# Patient Record
Sex: FEMALE | Race: BLACK OR AFRICAN AMERICAN | NOT HISPANIC OR LATINO | Employment: OTHER | URBAN - METROPOLITAN AREA
[De-identification: names, ages, dates, MRNs, and addresses within clinical notes are randomized per-mention and may not be internally consistent; named-entity substitution may affect disease eponyms.]

---

## 2023-04-12 ENCOUNTER — OFFICE VISIT (OUTPATIENT)
Dept: PRIMARY CARE | Facility: CLINIC | Age: 78
End: 2023-04-12
Payer: MEDICARE

## 2023-04-12 VITALS
HEIGHT: 69 IN | BODY MASS INDEX: 36.14 KG/M2 | WEIGHT: 244 LBS | SYSTOLIC BLOOD PRESSURE: 110 MMHG | TEMPERATURE: 97.1 F | DIASTOLIC BLOOD PRESSURE: 70 MMHG

## 2023-04-12 DIAGNOSIS — I27.21 PULMONARY ARTERIAL HYPERTENSION (MULTI): ICD-10-CM

## 2023-04-12 DIAGNOSIS — J44.9 CHRONIC OBSTRUCTIVE PULMONARY DISEASE, UNSPECIFIED (MULTI): ICD-10-CM

## 2023-04-12 DIAGNOSIS — Z86.39 H/O: OBESITY: ICD-10-CM

## 2023-04-12 DIAGNOSIS — I10 HYPERTENSION, UNSPECIFIED TYPE: ICD-10-CM

## 2023-04-12 DIAGNOSIS — G47.30 SLEEP APNEA, UNSPECIFIED TYPE: Primary | ICD-10-CM

## 2023-04-12 PROCEDURE — 3078F DIAST BP <80 MM HG: CPT | Performed by: INTERNAL MEDICINE

## 2023-04-12 PROCEDURE — 3074F SYST BP LT 130 MM HG: CPT | Performed by: INTERNAL MEDICINE

## 2023-04-12 PROCEDURE — 99214 OFFICE O/P EST MOD 30 MIN: CPT | Performed by: INTERNAL MEDICINE

## 2023-04-12 PROCEDURE — G0439 PPPS, SUBSEQ VISIT: HCPCS | Performed by: INTERNAL MEDICINE

## 2023-04-12 NOTE — PROGRESS NOTES
I reviewed with the resident the medical history and the resident’s findings on physical examination.  I discussed with the resident the patient’s diagnosis and concur with the treatment plan as documented in the resident note.     I saw and evaluated the patient. I personally obtained the key and critical portions of the history and physical exam or was physically present for key and critical portions performed by the trainee. I reviewed the trainee's documentation and discussed the patient with the trainee. I agree with the trainee's medical decision making, as documented on the trainee's note.     Coretta Silver MD

## 2023-04-12 NOTE — PATIENT INSTRUCTIONS
Thank you for visiting Dr. Vitale and Dr. Hitchcock at the clinic today. It was juan meeting you today! We discussed your current shortness of breath and bilateral knee pain. We discussed how it could be a cardiac or pulmonary issue. After reviewing your imaging we are concerned for Pulmonary  Hypertension. We plan to evaluate you for sleep apnea and to see a specialist in pulmonary HTN.

## 2023-04-12 NOTE — PROGRESS NOTES
Subjective   Patient ID: Florecita Forde is a 77 y.o. female who presents for Follow-up.  FLORECITA GATES is a 77 year old female with PMHx of HTN, A. fib on eliquis, COPD, Osteoporsis, and Obesity who presents for her wellness exam and follow up from a CXR result.  Patient reports that she has been short of breath for little while now.  She became short of breath with exertion and reports that she has difficulty lying down as she becomes short of breath and becomes dizzy she denies chest pain however she does admit to some constipation.  She denies any lower extremity swelling and reports her urination is good.  Patient recently had a chest x-ray which showed concerns for pulmonary congestion along with an echo that showed concerns for pulmonary hypertension.  Patient is high risk for sleep apnea so we will obtain a sleep study.  Patient does report that she needs bilateral knee surgery however she needs to continue to lose weight first.  She uses a stationary bike and finds no difficulty with that.  Patient's knee is particularly worse with steps.  Patient lives alone on a one-story floor uses a walker and cane for stability.  Review of Systems  - CONSTITUTIONAL: Denies weight loss, fever and chills.  - HEENT: Denies changes in vision and hearing.  - RESPIRATORY: admits to SOB but denies cough.  - CV: Denies palpitations and CP.   - GI: Denies abdominal pain, nausea, vomiting and diarrhea.  - : Denies dysuria and urinary frequency.  - MSK: Denies myalgia and joint pain.  - SKIN: Denies rash and pruritus.  - NEUROLOGICAL: Denies headache and syncope.  - PSYCHIATRIC: Denies recent changes in mood. Denies anxiety and depression.  Objective   Physical Exam  Constitutional: patient is alert and cooperative with exam  skin: dry and warm  Eyes: EOMI and clear sclera  ENMT: moist mucous membranes  Head/Neck: obese neck  Respiratory/Thorax: Clear to auscultation bilaterally, no wheezing or crackles  appreciated  Cardiovascular: irregular regular rate and rhythm, S1 and S2 present, no murmurs heard  Gastrointestinal: bowel sounds present, no pain or tenderness upon palpation, soft and nondistended  Extremities: +2 radial, posterior tibial, and pedal pulse, no lower extremity edema noted  Neurological: AxOx3  Assessment/Plan   Diagnoses and all orders for this visit:  Sleep apnea, unspecified type  -     In-Center Sleep Study (Non-Sleep Provider Only)  Pulmonary arterial hypertension (CMS/HCC)  -     Referral to Pulmonary Hypertension; Future  Hypertension, unspecified type  -     CBC; Future  -     Comprehensive Metabolic Panel; Future  -     Tsh With Reflex To Free T4 If Abnormal; Future  -     Hemoglobin A1c; Future  -     Urinalysis with Reflex Microscopic; Future  H/O: obesity  -     Hemoglobin A1c; Future    FLORECITA CORTES is a 77 year old female with PMHx of HTN, A. fib on eliquis, COPD, Osteoporsis, and Obesity who presents for her wellness exam.     #Dyspnea With Exertion   - Pulm HTN vs Sleep Apnea vs new onset heart failure   - CXR on April 5, 2023 showed pulmonary vascular congestion with a component of mild interstitial edema   - ECHO showed EF 7-75%, LA moderately to severely dilated, moderate tricuspid regurg, severely elevated pulm artery pressure  - referral to pulm HTN sent along with sleep study    #HTN   - /70  - amlodipine 5mg every day and metoprolol tartrate 25mg BID     #HLD   - atorvastatin 20mg every day     #A. Fib  - on eliquis 5mg BID    #COPD  - albuterol  and breo ellipta with albuterol nebulizer     #Osteoporsis  - alendronate 7omg every day   - DEXA scan November 2023    #Obesity     #Health Maintenance   - mammogram on July 2022 showed no mammgraphic evidence of malignancy, repeat is due July 2023  - DEXA November 2021, T-score -2.8, repeat due Novemeber 2023   - colonoscopy done November 2021 showed multiple polyps with pathophys tubular adenoma with repeat  colonoscopy in 3 years, November 2024  - immunizations up to date  - lab work done reviewed, is due for repeat    Omero Vitale  Internal Medicine PGY-3  [facility]

## 2023-04-13 RX ORDER — FLUTICASONE FUROATE AND VILANTEROL TRIFENATATE 100; 25 UG/1; UG/1
POWDER RESPIRATORY (INHALATION)
Qty: 60 EACH | Refills: 2 | Status: SHIPPED | OUTPATIENT
Start: 2023-04-13 | End: 2023-06-27

## 2023-04-19 DIAGNOSIS — I27.21 PULMONARY ARTERIAL HYPERTENSION (MULTI): ICD-10-CM

## 2023-04-19 RX ORDER — APIXABAN 5 MG/1
5 TABLET, FILM COATED ORAL 2 TIMES DAILY
COMMUNITY
End: 2023-04-19 | Stop reason: SDUPTHER

## 2023-05-31 DIAGNOSIS — I10 HYPERTENSION, UNSPECIFIED TYPE: ICD-10-CM

## 2023-06-01 RX ORDER — METOPROLOL TARTRATE 25 MG/1
TABLET, FILM COATED ORAL
Qty: 180 TABLET | Refills: 0 | Status: SHIPPED | OUTPATIENT
Start: 2023-06-01 | End: 2023-09-15

## 2023-06-07 ENCOUNTER — LAB (OUTPATIENT)
Dept: LAB | Facility: LAB | Age: 78
End: 2023-06-07
Payer: MEDICARE

## 2023-06-07 DIAGNOSIS — Z86.39 H/O: OBESITY: ICD-10-CM

## 2023-06-07 DIAGNOSIS — I10 HYPERTENSION, UNSPECIFIED TYPE: ICD-10-CM

## 2023-06-07 LAB
ALANINE AMINOTRANSFERASE (SGPT) (U/L) IN SER/PLAS: 10 U/L (ref 7–45)
ALBUMIN (G/DL) IN SER/PLAS: 3.7 G/DL (ref 3.4–5)
ALKALINE PHOSPHATASE (U/L) IN SER/PLAS: 82 U/L (ref 33–136)
ANION GAP IN SER/PLAS: 13 MMOL/L (ref 10–20)
APPEARANCE, URINE: ABNORMAL
ASPARTATE AMINOTRANSFERASE (SGOT) (U/L) IN SER/PLAS: 12 U/L (ref 9–39)
BACTERIA, URINE: ABNORMAL /HPF
BILIRUBIN TOTAL (MG/DL) IN SER/PLAS: 1 MG/DL (ref 0–1.2)
BILIRUBIN, URINE: NEGATIVE
BLOOD, URINE: NEGATIVE
CALCIUM (MG/DL) IN SER/PLAS: 9.7 MG/DL (ref 8.6–10.6)
CARBON DIOXIDE, TOTAL (MMOL/L) IN SER/PLAS: 32 MMOL/L (ref 21–32)
CHLORIDE (MMOL/L) IN SER/PLAS: 104 MMOL/L (ref 98–107)
COLOR, URINE: YELLOW
CREATININE (MG/DL) IN SER/PLAS: 0.53 MG/DL (ref 0.5–1.05)
ERYTHROCYTE DISTRIBUTION WIDTH (RATIO) BY AUTOMATED COUNT: 14.3 % (ref 11.5–14.5)
ERYTHROCYTE MEAN CORPUSCULAR HEMOGLOBIN CONCENTRATION (G/DL) BY AUTOMATED: 30.2 G/DL (ref 32–36)
ERYTHROCYTE MEAN CORPUSCULAR VOLUME (FL) BY AUTOMATED COUNT: 99 FL (ref 80–100)
ERYTHROCYTES (10*6/UL) IN BLOOD BY AUTOMATED COUNT: 3.73 X10E12/L (ref 4–5.2)
ESTIMATED AVERAGE GLUCOSE FOR HBA1C: 134 MG/DL
GFR FEMALE: >90 ML/MIN/1.73M2
GLUCOSE (MG/DL) IN SER/PLAS: 110 MG/DL (ref 74–99)
GLUCOSE, URINE: NEGATIVE MG/DL
HEMATOCRIT (%) IN BLOOD BY AUTOMATED COUNT: 37.1 % (ref 36–46)
HEMOGLOBIN (G/DL) IN BLOOD: 11.2 G/DL (ref 12–16)
HEMOGLOBIN A1C/HEMOGLOBIN TOTAL IN BLOOD: 6.3 %
KETONES, URINE: NEGATIVE MG/DL
LEUKOCYTE ESTERASE, URINE: ABNORMAL
LEUKOCYTES (10*3/UL) IN BLOOD BY AUTOMATED COUNT: 8 X10E9/L (ref 4.4–11.3)
MUCUS, URINE: ABNORMAL /LPF
NITRITE, URINE: POSITIVE
NRBC (PER 100 WBCS) BY AUTOMATED COUNT: 0 /100 WBC (ref 0–0)
PH, URINE: 7 (ref 5–8)
PLATELETS (10*3/UL) IN BLOOD AUTOMATED COUNT: 425 X10E9/L (ref 150–450)
POTASSIUM (MMOL/L) IN SER/PLAS: 3.8 MMOL/L (ref 3.5–5.3)
PROTEIN TOTAL: 7.4 G/DL (ref 6.4–8.2)
PROTEIN, URINE: ABNORMAL MG/DL
RBC, URINE: 7 /HPF (ref 0–5)
SODIUM (MMOL/L) IN SER/PLAS: 145 MMOL/L (ref 136–145)
SPECIFIC GRAVITY, URINE: 1.01 (ref 1–1.03)
SQUAMOUS EPITHELIAL CELLS, URINE: 2 /HPF
THYROTROPIN (MIU/L) IN SER/PLAS BY DETECTION LIMIT <= 0.05 MIU/L: 0.71 MIU/L (ref 0.44–3.98)
UREA NITROGEN (MG/DL) IN SER/PLAS: 7 MG/DL (ref 6–23)
UROBILINOGEN, URINE: 4 MG/DL (ref 0–1.9)
WBC CLUMPS, URINE: ABNORMAL /HPF
WBC, URINE: 229 /HPF (ref 0–5)

## 2023-06-07 PROCEDURE — 80053 COMPREHEN METABOLIC PANEL: CPT

## 2023-06-07 PROCEDURE — 36415 COLL VENOUS BLD VENIPUNCTURE: CPT

## 2023-06-07 PROCEDURE — 81001 URINALYSIS AUTO W/SCOPE: CPT

## 2023-06-07 PROCEDURE — 84443 ASSAY THYROID STIM HORMONE: CPT

## 2023-06-07 PROCEDURE — 83036 HEMOGLOBIN GLYCOSYLATED A1C: CPT

## 2023-06-07 PROCEDURE — 85027 COMPLETE CBC AUTOMATED: CPT

## 2023-06-08 DIAGNOSIS — N30.00 ACUTE CYSTITIS WITHOUT HEMATURIA: Primary | ICD-10-CM

## 2023-06-08 PROBLEM — M19.90 ARTHRITIS: Status: ACTIVE | Noted: 2023-06-08

## 2023-06-08 PROBLEM — I95.9 HYPOTENSION: Status: ACTIVE | Noted: 2023-06-08

## 2023-06-08 PROBLEM — Z86.0101 HISTORY OF ADENOMATOUS POLYP OF COLON: Status: ACTIVE | Noted: 2023-06-08

## 2023-06-08 PROBLEM — M17.12 PRIMARY OSTEOARTHRITIS OF LEFT KNEE: Status: ACTIVE | Noted: 2023-06-08

## 2023-06-08 PROBLEM — M25.569 KNEE PAIN: Status: ACTIVE | Noted: 2023-06-08

## 2023-06-08 PROBLEM — R73.01 IMPAIRED FASTING GLUCOSE: Status: ACTIVE | Noted: 2023-06-08

## 2023-06-08 PROBLEM — R78.81 BACTEREMIA: Status: ACTIVE | Noted: 2023-06-08

## 2023-06-08 PROBLEM — R06.83 SNORING: Status: ACTIVE | Noted: 2023-06-08

## 2023-06-08 PROBLEM — M81.0 OSTEOPOROSIS: Status: ACTIVE | Noted: 2023-06-08

## 2023-06-08 PROBLEM — R74.8 ELEVATED LIVER ENZYMES: Status: ACTIVE | Noted: 2023-06-08

## 2023-06-08 PROBLEM — J44.9 CHRONIC OBSTRUCTIVE PULMONARY DISEASE (MULTI): Status: ACTIVE | Noted: 2023-06-08

## 2023-06-08 PROBLEM — E66.9 OBESITY: Status: ACTIVE | Noted: 2023-06-08

## 2023-06-08 PROBLEM — E78.5 BORDERLINE HYPERLIPIDEMIA: Status: ACTIVE | Noted: 2023-06-08

## 2023-06-08 PROBLEM — N63.0 BREAST LUMP: Status: ACTIVE | Noted: 2023-06-08

## 2023-06-08 PROBLEM — I10 ESSENTIAL HYPERTENSION: Status: ACTIVE | Noted: 2023-06-08

## 2023-06-08 PROBLEM — E78.01 FAMILIAL HYPERCHOLESTEREMIA: Status: ACTIVE | Noted: 2023-06-08

## 2023-06-08 PROBLEM — E83.42 HYPOMAGNESEMIA: Status: ACTIVE | Noted: 2023-06-08

## 2023-06-08 PROBLEM — I48.91 ATRIAL FIBRILLATION (MULTI): Status: ACTIVE | Noted: 2023-06-08

## 2023-06-08 PROBLEM — R06.09 DOE (DYSPNEA ON EXERTION): Status: ACTIVE | Noted: 2023-06-08

## 2023-06-08 PROBLEM — R92.8 ABNORMAL MAMMOGRAM: Status: ACTIVE | Noted: 2023-06-08

## 2023-06-08 PROBLEM — Z86.010 HISTORY OF ADENOMATOUS POLYP OF COLON: Status: ACTIVE | Noted: 2023-06-08

## 2023-06-08 PROBLEM — R06.89 DIFFICULTY BREATHING: Status: ACTIVE | Noted: 2023-06-08

## 2023-06-08 RX ORDER — LANOLIN ALCOHOL/MO/W.PET/CERES
400 CREAM (GRAM) TOPICAL DAILY
COMMUNITY
Start: 2020-07-01 | End: 2023-10-03 | Stop reason: SDUPTHER

## 2023-06-08 RX ORDER — AMLODIPINE BESYLATE 10 MG/1
10 TABLET ORAL DAILY
COMMUNITY
End: 2023-06-14 | Stop reason: SDUPTHER

## 2023-06-08 RX ORDER — ATORVASTATIN CALCIUM 20 MG/1
20 TABLET, FILM COATED ORAL EVERY EVENING
COMMUNITY
End: 2023-06-14 | Stop reason: SDUPTHER

## 2023-06-08 RX ORDER — ACETAMINOPHEN 500 MG
50 TABLET ORAL DAILY
COMMUNITY
Start: 2014-03-28 | End: 2024-01-31 | Stop reason: SDUPTHER

## 2023-06-08 RX ORDER — LATANOPROST 50 UG/ML
1 SOLUTION/ DROPS OPHTHALMIC NIGHTLY
COMMUNITY
Start: 2023-05-16 | End: 2023-10-03 | Stop reason: SDUPTHER

## 2023-06-08 RX ORDER — DORZOLAMIDE HCL 20 MG/ML
1 SOLUTION/ DROPS OPHTHALMIC 3 TIMES DAILY
COMMUNITY
Start: 2023-04-30 | End: 2023-10-03 | Stop reason: SDUPTHER

## 2023-06-08 RX ORDER — ALENDRONATE SODIUM 70 MG/1
70 TABLET ORAL
COMMUNITY
Start: 2018-01-26 | End: 2023-06-14 | Stop reason: SDUPTHER

## 2023-06-08 RX ORDER — NITROFURANTOIN 25; 75 MG/1; MG/1
100 CAPSULE ORAL 2 TIMES DAILY
Qty: 10 CAPSULE | Refills: 0 | Status: SHIPPED | OUTPATIENT
Start: 2023-06-08 | End: 2023-06-13

## 2023-06-08 RX ORDER — ALBUTEROL SULFATE 90 UG/1
1 AEROSOL, METERED RESPIRATORY (INHALATION) EVERY 4 HOURS PRN
COMMUNITY
End: 2023-06-19

## 2023-06-08 RX ORDER — TIMOLOL MALEATE 5 MG/ML
1 SOLUTION/ DROPS OPHTHALMIC DAILY
COMMUNITY
Start: 2023-06-05 | End: 2023-10-03 | Stop reason: SDUPTHER

## 2023-06-08 RX ORDER — IPRATROPIUM BROMIDE AND ALBUTEROL SULFATE 2.5; .5 MG/3ML; MG/3ML
3 SOLUTION RESPIRATORY (INHALATION)
COMMUNITY
Start: 2018-12-21 | End: 2023-10-03 | Stop reason: SDUPTHER

## 2023-06-12 ENCOUNTER — APPOINTMENT (OUTPATIENT)
Dept: PRIMARY CARE | Facility: CLINIC | Age: 78
End: 2023-06-12
Payer: MEDICARE

## 2023-06-14 DIAGNOSIS — M81.0 OSTEOPOROSIS, UNSPECIFIED OSTEOPOROSIS TYPE, UNSPECIFIED PATHOLOGICAL FRACTURE PRESENCE: ICD-10-CM

## 2023-06-14 DIAGNOSIS — I10 ESSENTIAL HYPERTENSION: ICD-10-CM

## 2023-06-14 DIAGNOSIS — I27.21 PULMONARY ARTERIAL HYPERTENSION (MULTI): ICD-10-CM

## 2023-06-14 DIAGNOSIS — I48.91 ATRIAL FIBRILLATION, UNSPECIFIED TYPE (MULTI): ICD-10-CM

## 2023-06-14 DIAGNOSIS — E78.01 FAMILIAL HYPERCHOLESTEREMIA: ICD-10-CM

## 2023-06-14 RX ORDER — ALENDRONATE SODIUM 70 MG/1
70 TABLET ORAL
Qty: 12 TABLET | Refills: 3 | Status: SHIPPED | OUTPATIENT
Start: 2023-06-14 | End: 2023-06-19

## 2023-06-14 RX ORDER — AMLODIPINE BESYLATE 10 MG/1
10 TABLET ORAL DAILY
Qty: 90 TABLET | Refills: 0 | Status: SHIPPED | OUTPATIENT
Start: 2023-06-14 | End: 2023-10-03 | Stop reason: DRUGHIGH

## 2023-06-14 RX ORDER — ATORVASTATIN CALCIUM 20 MG/1
20 TABLET, FILM COATED ORAL EVERY EVENING
Qty: 90 TABLET | Refills: 0 | Status: SHIPPED | OUTPATIENT
Start: 2023-06-14 | End: 2023-06-19

## 2023-06-19 DIAGNOSIS — M81.0 OSTEOPOROSIS, UNSPECIFIED OSTEOPOROSIS TYPE, UNSPECIFIED PATHOLOGICAL FRACTURE PRESENCE: ICD-10-CM

## 2023-06-19 DIAGNOSIS — E78.01 FAMILIAL HYPERCHOLESTEREMIA: ICD-10-CM

## 2023-06-19 DIAGNOSIS — J44.9 CHRONIC OBSTRUCTIVE PULMONARY DISEASE, UNSPECIFIED (MULTI): ICD-10-CM

## 2023-06-19 RX ORDER — ALENDRONATE SODIUM 70 MG/1
TABLET ORAL
Qty: 12 TABLET | Refills: 3 | Status: SHIPPED | OUTPATIENT
Start: 2023-06-19 | End: 2023-10-03 | Stop reason: SDUPTHER

## 2023-06-19 RX ORDER — ATORVASTATIN CALCIUM 20 MG/1
20 TABLET, FILM COATED ORAL EVERY EVENING
Qty: 90 TABLET | Refills: 0 | Status: SHIPPED | OUTPATIENT
Start: 2023-06-19 | End: 2023-09-15

## 2023-06-19 RX ORDER — ALBUTEROL SULFATE 90 UG/1
AEROSOL, METERED RESPIRATORY (INHALATION)
Qty: 18 G | Refills: 2 | Status: SHIPPED | OUTPATIENT
Start: 2023-06-19 | End: 2023-09-15

## 2023-06-27 ENCOUNTER — OFFICE VISIT (OUTPATIENT)
Dept: PRIMARY CARE | Facility: CLINIC | Age: 78
End: 2023-06-27
Payer: MEDICARE

## 2023-06-27 VITALS
OXYGEN SATURATION: 95 % | SYSTOLIC BLOOD PRESSURE: 108 MMHG | BODY MASS INDEX: 34.7 KG/M2 | WEIGHT: 235 LBS | HEART RATE: 83 BPM | DIASTOLIC BLOOD PRESSURE: 70 MMHG

## 2023-06-27 DIAGNOSIS — J44.9 CHRONIC OBSTRUCTIVE PULMONARY DISEASE, UNSPECIFIED COPD TYPE (MULTI): Primary | ICD-10-CM

## 2023-06-27 DIAGNOSIS — R73.01 IMPAIRED FASTING GLUCOSE: ICD-10-CM

## 2023-06-27 DIAGNOSIS — I10 ESSENTIAL HYPERTENSION: ICD-10-CM

## 2023-06-27 DIAGNOSIS — Z00.00 ANNUAL PHYSICAL EXAM: ICD-10-CM

## 2023-06-27 DIAGNOSIS — J44.9 CHRONIC OBSTRUCTIVE PULMONARY DISEASE, UNSPECIFIED (MULTI): ICD-10-CM

## 2023-06-27 DIAGNOSIS — E78.5 BORDERLINE HYPERLIPIDEMIA: ICD-10-CM

## 2023-06-27 DIAGNOSIS — Z78.0 MENOPAUSE: ICD-10-CM

## 2023-06-27 DIAGNOSIS — I48.91 ATRIAL FIBRILLATION, UNSPECIFIED TYPE (MULTI): ICD-10-CM

## 2023-06-27 PROCEDURE — 3074F SYST BP LT 130 MM HG: CPT | Performed by: INTERNAL MEDICINE

## 2023-06-27 PROCEDURE — 1159F MED LIST DOCD IN RCRD: CPT | Performed by: INTERNAL MEDICINE

## 2023-06-27 PROCEDURE — 3078F DIAST BP <80 MM HG: CPT | Performed by: INTERNAL MEDICINE

## 2023-06-27 PROCEDURE — 99214 OFFICE O/P EST MOD 30 MIN: CPT | Performed by: INTERNAL MEDICINE

## 2023-06-27 RX ORDER — FLUTICASONE FUROATE AND VILANTEROL TRIFENATATE 100; 25 UG/1; UG/1
POWDER RESPIRATORY (INHALATION)
Qty: 60 EACH | Refills: 2 | Status: SHIPPED | OUTPATIENT
Start: 2023-06-27 | End: 2023-10-02

## 2023-06-27 ASSESSMENT — PATIENT HEALTH QUESTIONNAIRE - PHQ9
2. FEELING DOWN, DEPRESSED OR HOPELESS: SEVERAL DAYS
SUM OF ALL RESPONSES TO PHQ9 QUESTIONS 1 AND 2: 2
1. LITTLE INTEREST OR PLEASURE IN DOING THINGS: SEVERAL DAYS

## 2023-06-27 ASSESSMENT — ENCOUNTER SYMPTOMS
CONSTITUTIONAL NEGATIVE: 1
CARDIOVASCULAR NEGATIVE: 1
WHEEZING: 1
SHORTNESS OF BREATH: 1
GASTROINTESTINAL NEGATIVE: 1

## 2023-06-27 NOTE — PROGRESS NOTES
Subjective   Patient ID: Mary Ann Forde is a 77 y.o. female who presents for Follow-up.  HPI    Review of Systems   Constitutional: Negative.    Respiratory:  Positive for shortness of breath and wheezing.    Cardiovascular: Negative.    Gastrointestinal: Negative.        Objective   Physical Exam  Cardiovascular:      Heart sounds: No murmur heard.  Pulmonary:      Effort: No respiratory distress.      Breath sounds: Rhonchi and rales present. No wheezing.   Chest:      Chest wall: Tenderness present.   Abdominal:      General: Bowel sounds are normal. There is no distension.         Assessment/Plan   Problem List Items Addressed This Visit       Atrial fibrillation (CMS/HCC)    Borderline hyperlipidemia    Chronic obstructive pulmonary disease (CMS/[facility]) - Primary    Essential hypertension    Impaired fasting glucose     Other Visit Diagnoses       Annual physical exam        Relevant Orders    XR DEXA bone density    Menopause        Relevant Orders    XR DEXA bone density        The patient is here for a follow up on chronic medical problems.  His medications were reviewed, pharmacy updated, notes reviewed, prior labs reviewed.  Patient needing a nebulizer machine for use at home to help with her breathing.     Lab Results   Component Value Date    WBC 8.0 06/07/2023    HGB 11.2 (L) 06/07/2023    HCT 37.1 06/07/2023     06/07/2023    CHOL 127 11/09/2022    TRIG 57 11/09/2022    HDL 49.7 11/09/2022    ALT 10 06/07/2023    AST 12 06/07/2023     06/07/2023    K 3.8 06/07/2023     06/07/2023    CREATININE 0.53 06/07/2023    BUN 7 06/07/2023    CO2 32 06/07/2023    TSH 0.71 06/07/2023    INR 1.5 (H) 04/29/2022    HGBA1C 6.3 (A) 06/07/2023     par    MDM  1) COMPLEXITY: MORE THAN 1 STABLE CHRONIC CONDITION ADDRESSED OR 1 ACUTE ILLNESS ADDRESSED   2)DATA: TESTS INTERPRETED AND OR ORDERED, TOOK INDEPENDENT HISTORY OR RECORDS REVIEWED  3)RISK: MODERATE RISK DUE TO NATURE OF MEDICAL  CONDITIONS/COMORBIDITY OR MEDICATIONS ORDERED OR SURGICAL OR PROCEDURE REFERRAL             Coretta Silver MD

## 2023-07-10 DIAGNOSIS — J44.9 CHRONIC OBSTRUCTIVE PULMONARY DISEASE, UNSPECIFIED COPD TYPE (MULTI): ICD-10-CM

## 2023-07-11 RX ORDER — IPRATROPIUM BROMIDE AND ALBUTEROL SULFATE 2.5; .5 MG/3ML; MG/3ML
3 SOLUTION RESPIRATORY (INHALATION)
COMMUNITY
Start: 2018-12-21 | End: 2023-10-03 | Stop reason: SDUPTHER

## 2023-09-15 DIAGNOSIS — I10 HYPERTENSION, UNSPECIFIED TYPE: ICD-10-CM

## 2023-09-15 DIAGNOSIS — J44.9 CHRONIC OBSTRUCTIVE PULMONARY DISEASE, UNSPECIFIED (MULTI): ICD-10-CM

## 2023-09-15 DIAGNOSIS — E78.01 FAMILIAL HYPERCHOLESTEREMIA: ICD-10-CM

## 2023-09-15 RX ORDER — ATORVASTATIN CALCIUM 20 MG/1
20 TABLET, FILM COATED ORAL EVERY EVENING
Qty: 90 TABLET | Refills: 0 | Status: SHIPPED | OUTPATIENT
Start: 2023-09-15 | End: 2023-10-03 | Stop reason: SDUPTHER

## 2023-09-15 RX ORDER — METOPROLOL TARTRATE 25 MG/1
TABLET, FILM COATED ORAL
Qty: 180 TABLET | Refills: 0 | Status: SHIPPED | OUTPATIENT
Start: 2023-09-15 | End: 2023-10-03 | Stop reason: SDUPTHER

## 2023-09-15 RX ORDER — ALBUTEROL SULFATE 90 UG/1
AEROSOL, METERED RESPIRATORY (INHALATION)
Qty: 18 G | Refills: 2 | Status: SHIPPED | OUTPATIENT
Start: 2023-09-15 | End: 2023-11-16

## 2023-10-02 DIAGNOSIS — I48.91 ATRIAL FIBRILLATION, UNSPECIFIED TYPE (MULTI): ICD-10-CM

## 2023-10-02 DIAGNOSIS — J44.9 CHRONIC OBSTRUCTIVE PULMONARY DISEASE, UNSPECIFIED (MULTI): ICD-10-CM

## 2023-10-02 RX ORDER — FLUTICASONE FUROATE AND VILANTEROL TRIFENATATE 100; 25 UG/1; UG/1
POWDER RESPIRATORY (INHALATION)
Qty: 60 EACH | Refills: 2 | Status: SHIPPED | OUTPATIENT
Start: 2023-10-02 | End: 2023-10-03 | Stop reason: SDUPTHER

## 2023-10-02 RX ORDER — APIXABAN 5 MG/1
5 TABLET, FILM COATED ORAL 2 TIMES DAILY
Qty: 180 TABLET | Refills: 0 | Status: SHIPPED | OUTPATIENT
Start: 2023-10-02 | End: 2023-10-03 | Stop reason: SDUPTHER

## 2023-10-03 ENCOUNTER — LAB (OUTPATIENT)
Dept: LAB | Facility: LAB | Age: 78
End: 2023-10-03
Payer: MEDICARE

## 2023-10-03 ENCOUNTER — OFFICE VISIT (OUTPATIENT)
Dept: PRIMARY CARE | Facility: CLINIC | Age: 78
End: 2023-10-03
Payer: MEDICARE

## 2023-10-03 VITALS — BODY MASS INDEX: 35.44 KG/M2 | WEIGHT: 240 LBS | SYSTOLIC BLOOD PRESSURE: 110 MMHG | DIASTOLIC BLOOD PRESSURE: 80 MMHG

## 2023-10-03 DIAGNOSIS — E78.01 FAMILIAL HYPERCHOLESTEREMIA: ICD-10-CM

## 2023-10-03 DIAGNOSIS — E55.9 HYPOVITAMINOSIS D: ICD-10-CM

## 2023-10-03 DIAGNOSIS — Z00.00 ROUTINE HEALTH MAINTENANCE: ICD-10-CM

## 2023-10-03 DIAGNOSIS — I48.91 ATRIAL FIBRILLATION, UNSPECIFIED TYPE (MULTI): ICD-10-CM

## 2023-10-03 DIAGNOSIS — I10 HYPERTENSION, UNSPECIFIED TYPE: ICD-10-CM

## 2023-10-03 DIAGNOSIS — J44.9 CHRONIC OBSTRUCTIVE PULMONARY DISEASE, UNSPECIFIED (MULTI): ICD-10-CM

## 2023-10-03 DIAGNOSIS — Z23 NEED FOR IMMUNIZATION AGAINST INFLUENZA: ICD-10-CM

## 2023-10-03 DIAGNOSIS — I10 ESSENTIAL HYPERTENSION: ICD-10-CM

## 2023-10-03 DIAGNOSIS — J44.1 CHRONIC OBSTRUCTIVE PULMONARY DISEASE WITH ACUTE EXACERBATION (MULTI): ICD-10-CM

## 2023-10-03 DIAGNOSIS — Z23 NEED FOR TDAP VACCINATION: ICD-10-CM

## 2023-10-03 DIAGNOSIS — Z00.00 ROUTINE HEALTH MAINTENANCE: Primary | ICD-10-CM

## 2023-10-03 DIAGNOSIS — M81.0 OSTEOPOROSIS, UNSPECIFIED OSTEOPOROSIS TYPE, UNSPECIFIED PATHOLOGICAL FRACTURE PRESENCE: ICD-10-CM

## 2023-10-03 LAB
25(OH)D3 SERPL-MCNC: 45 NG/ML (ref 30–100)
CHOLEST SERPL-MCNC: 133 MG/DL (ref 0–199)
CHOLESTEROL/HDL RATIO: 2.3
CREAT UR-MCNC: 200.9 MG/DL (ref 20–320)
HDLC SERPL-MCNC: 57.6 MG/DL
LDLC SERPL CALC-MCNC: 64 MG/DL (ref 140–190)
MICROALBUMIN UR-MCNC: <7 MG/L
MICROALBUMIN/CREAT UR: NORMAL MG/G{CREAT}
NON HDL CHOLESTEROL: 75 MG/DL (ref 0–149)
TRIGL SERPL-MCNC: 58 MG/DL (ref 0–149)
TSH SERPL-ACNC: 1.05 MIU/L (ref 0.44–3.98)
VLDL: 12 MG/DL (ref 0–40)

## 2023-10-03 PROCEDURE — 99214 OFFICE O/P EST MOD 30 MIN: CPT | Performed by: INTERNAL MEDICINE

## 2023-10-03 PROCEDURE — 3074F SYST BP LT 130 MM HG: CPT | Performed by: INTERNAL MEDICINE

## 2023-10-03 PROCEDURE — 90662 IIV NO PRSV INCREASED AG IM: CPT | Performed by: INTERNAL MEDICINE

## 2023-10-03 PROCEDURE — 1159F MED LIST DOCD IN RCRD: CPT | Performed by: INTERNAL MEDICINE

## 2023-10-03 PROCEDURE — G0008 ADMIN INFLUENZA VIRUS VAC: HCPCS | Performed by: INTERNAL MEDICINE

## 2023-10-03 PROCEDURE — 90715 TDAP VACCINE 7 YRS/> IM: CPT | Performed by: INTERNAL MEDICINE

## 2023-10-03 PROCEDURE — 36415 COLL VENOUS BLD VENIPUNCTURE: CPT

## 2023-10-03 PROCEDURE — 3079F DIAST BP 80-89 MM HG: CPT | Performed by: INTERNAL MEDICINE

## 2023-10-03 RX ORDER — IPRATROPIUM BROMIDE AND ALBUTEROL SULFATE 2.5; .5 MG/3ML; MG/3ML
3 SOLUTION RESPIRATORY (INHALATION)
Qty: 180 ML | Refills: 1 | Status: SHIPPED | OUTPATIENT
Start: 2023-10-03 | End: 2024-01-31 | Stop reason: SDUPTHER

## 2023-10-03 RX ORDER — FLUTICASONE FUROATE AND VILANTEROL 100; 25 UG/1; UG/1
1 POWDER RESPIRATORY (INHALATION) DAILY
Qty: 60 EACH | Refills: 2 | Status: SHIPPED | OUTPATIENT
Start: 2023-10-03 | End: 2024-01-31 | Stop reason: SDUPTHER

## 2023-10-03 RX ORDER — METOPROLOL TARTRATE 25 MG/1
25 TABLET, FILM COATED ORAL 2 TIMES DAILY
Qty: 180 TABLET | Refills: 0 | Status: SHIPPED | OUTPATIENT
Start: 2023-10-03 | End: 2024-01-31 | Stop reason: SDUPTHER

## 2023-10-03 RX ORDER — DORZOLAMIDE HCL 20 MG/ML
1 SOLUTION/ DROPS OPHTHALMIC 3 TIMES DAILY
Qty: 4.5 ML | Refills: 0 | Status: SHIPPED | OUTPATIENT
Start: 2023-10-03 | End: 2023-10-18

## 2023-10-03 RX ORDER — TIMOLOL MALEATE 5 MG/ML
1 SOLUTION/ DROPS OPHTHALMIC DAILY
Qty: 1.5 ML | Refills: 0 | Status: SHIPPED | OUTPATIENT
Start: 2023-10-03 | End: 2024-01-31 | Stop reason: SDUPTHER

## 2023-10-03 RX ORDER — LATANOPROST 50 UG/ML
1 SOLUTION/ DROPS OPHTHALMIC NIGHTLY
Qty: 1.5 ML | Refills: 0 | Status: SHIPPED | OUTPATIENT
Start: 2023-10-03 | End: 2023-11-02

## 2023-10-03 RX ORDER — ALENDRONATE SODIUM 70 MG/1
TABLET ORAL
Qty: 12 TABLET | Refills: 3 | Status: SHIPPED | OUTPATIENT
Start: 2023-10-03 | End: 2024-01-31 | Stop reason: WASHOUT

## 2023-10-03 RX ORDER — LANOLIN ALCOHOL/MO/W.PET/CERES
400 CREAM (GRAM) TOPICAL DAILY
Qty: 30 TABLET | Refills: 0 | Status: SHIPPED | OUTPATIENT
Start: 2023-10-03 | End: 2023-11-02

## 2023-10-03 RX ORDER — ATORVASTATIN CALCIUM 20 MG/1
20 TABLET, FILM COATED ORAL EVERY EVENING
Qty: 90 TABLET | Refills: 0 | Status: SHIPPED | OUTPATIENT
Start: 2023-10-03 | End: 2024-01-31 | Stop reason: SDUPTHER

## 2023-10-03 ASSESSMENT — ENCOUNTER SYMPTOMS
GASTROINTESTINAL NEGATIVE: 1
ENDOCRINE NEGATIVE: 1
LIGHT-HEADEDNESS: 1
DIZZINESS: 1
SHORTNESS OF BREATH: 1
CONSTITUTIONAL NEGATIVE: 1
EYES NEGATIVE: 1
WHEEZING: 1
CARDIOVASCULAR NEGATIVE: 1

## 2023-10-03 NOTE — PROGRESS NOTES
Subjective   Patient ID: Mary Ann Forde is a 77 y.o. female who presents for Follow-up and Med Refill.  HPI  Patient here for routine follow up. She describes occasional light headedness on exertion.    Review of Systems   Constitutional: Negative.    HENT: Negative.     Eyes: Negative.    Respiratory:  Positive for shortness of breath and wheezing.    Cardiovascular: Negative.    Gastrointestinal: Negative.    Endocrine: Negative.    Genitourinary: Negative.    Neurological:  Positive for dizziness and light-headedness.       Objective   /80   Wt 109 kg (240 lb)   BMI 35.44 kg/m²    Lab Results   Component Value Date    WBC 8.0 06/07/2023    HGB 11.2 (L) 06/07/2023    HCT 37.1 06/07/2023    MCV 99 06/07/2023     06/07/2023      Lab Results   Component Value Date    GLUCOSE 110 (H) 06/07/2023    CALCIUM 9.7 06/07/2023     06/07/2023    K 3.8 06/07/2023    CO2 32 06/07/2023     06/07/2023    BUN 7 06/07/2023    CREATININE 0.53 06/07/2023      Physical Exam  Constitutional:       Appearance: Normal appearance. She is obese.   HENT:      Head: Normocephalic and atraumatic.      Nose: Nose normal.      Mouth/Throat:      Mouth: Mucous membranes are moist.      Pharynx: Oropharynx is clear.   Eyes:      Extraocular Movements: Extraocular movements intact.      Conjunctiva/sclera: Conjunctivae normal.      Pupils: Pupils are equal, round, and reactive to light.   Cardiovascular:      Rate and Rhythm: Normal rate and regular rhythm.      Pulses: Normal pulses.      Heart sounds: Normal heart sounds.   Pulmonary:      Effort: Pulmonary effort is normal.      Breath sounds: Normal breath sounds. No wheezing, rhonchi or rales.   Abdominal:      General: Abdomen is flat. Bowel sounds are normal. There is no distension.      Palpations: Abdomen is soft.      Tenderness: There is no abdominal tenderness. There is no guarding or rebound.   Musculoskeletal:         General: Normal range of motion.       Cervical back: Normal range of motion and neck supple.   Skin:     General: Skin is warm and dry.      Capillary Refill: Capillary refill takes less than 2 seconds.   Neurological:      General: No focal deficit present.      Mental Status: She is alert and oriented to person, place, and time.         Assessment/Plan   Problem List Items Addressed This Visit       Atrial fibrillation (CMS/HCC)    Relevant Medications    apixaban (Eliquis) 5 mg tablet    metoprolol tartrate (Lopressor) 25 mg tablet    Chronic obstructive pulmonary disease (CMS/HCC)    Relevant Medications    ipratropium-albuteroL (Duo-Neb) 0.5-2.5 mg/3 mL nebulizer solution    Other Relevant Orders    Portable Oxygen Condenser    Essential hypertension    Familial hypercholesteremia    Relevant Medications    atorvastatin (Lipitor) 20 mg tablet    Osteoporosis    Relevant Medications    alendronate (Fosamax) 70 mg tablet     Other Visit Diagnoses       Routine health maintenance    -  Primary    Relevant Medications    diclofenac sodium 1 % kit    dorzolamide (Trusopt) 2 % ophthalmic solution    fish oil concentrate (Omega-3) 120-180 mg capsule    latanoprost (Xalatan) 0.005 % ophthalmic solution    magnesium oxide (Mag-Ox) 400 mg (241.3 mg magnesium) tablet    timolol (Timoptic) 0.5 % ophthalmic solution    Other Relevant Orders    Tsh With Reflex To Free T4 If Abnormal    Hemoglobin A1c    Lipid panel    Vitamin D 25-Hydroxy,Total (for eval of Vitamin D levels)    Albumin , Urine Random    Need for immunization against influenza        Relevant Orders    Flu vaccine, quadrivalent, high-dose, preservative free, age 65y+ (FLUZONE) (Completed)    Hypovitaminosis D        Relevant Orders    Vitamin D 25-Hydroxy,Total (for eval of Vitamin D levels)    Chronic obstructive pulmonary disease, unspecified (CMS/HCC)        Relevant Medications    fluticasone furoate-vilanteroL (Breo Ellipta) 100-25 mcg/dose inhaler    Other Relevant Orders    Portable Oxygen  Condenser    Hypertension, unspecified type        Relevant Medications    metoprolol tartrate (Lopressor) 25 mg tablet          Patient is a 77 year old woman with HTN, HLD, COPD, osteoperosis, KEVIN, and atrial fibrillation on anticoagulation. She describes exertional near syncope which happens once or twice per week when she walks from her apartment to the car. Her home bp measurements are reported in the low 90s to 100s.    # Routine health maintenance  - Lipids, TSH, A1c, urine albumin, vitamin D ordered in 10/23  - CMP due 1/24  - CBC due 4/24    # Near syncope  - Suspect 2/2 antihypertensive medication  -- Decrease amlodipine to 5mg, re-evaluate when she returns to care

## 2023-10-04 DIAGNOSIS — I10 ESSENTIAL HYPERTENSION: Primary | ICD-10-CM

## 2023-10-04 LAB
EST. AVERAGE GLUCOSE BLD GHB EST-MCNC: 114 MG/DL
HBA1C MFR BLD: 5.6 %

## 2023-10-04 RX ORDER — AMLODIPINE BESYLATE 10 MG/1
10 TABLET ORAL DAILY
Qty: 90 TABLET | Refills: 0 | Status: SHIPPED | OUTPATIENT
Start: 2023-10-04 | End: 2023-12-20

## 2023-10-04 NOTE — RESULT ENCOUNTER NOTE
I reviewed your results.  Everything looks good.  Please continue with current treatment.  Best,  Dr. Hitchcock

## 2023-10-04 NOTE — PROGRESS NOTES
I saw and evaluated the patient. I personally obtained the key and critical portions of the history and physical exam or was physically present for key and critical portions performed by the resident/fellow. I reviewed the resident/fellow's documentation and discussed the patient with the resident/fellow. I agree with the resident/fellow's medical decision making as documented in the note.    Coretta Silver MD

## 2023-10-17 ENCOUNTER — TELEPHONE (OUTPATIENT)
Dept: PRIMARY CARE | Facility: CLINIC | Age: 78
End: 2023-10-17
Payer: MEDICARE

## 2023-10-17 NOTE — TELEPHONE ENCOUNTER
Blood pressures are fluctuating between 90-58 or 109/76 not feeling dizziness or anything.   Wondering what you would like her to do

## 2023-10-18 DIAGNOSIS — Z00.00 ROUTINE HEALTH MAINTENANCE: ICD-10-CM

## 2023-10-18 RX ORDER — DORZOLAMIDE HCL 20 MG/ML
1 SOLUTION/ DROPS OPHTHALMIC 3 TIMES DAILY
Qty: 30 ML | Refills: 1 | Status: SHIPPED | OUTPATIENT
Start: 2023-10-18 | End: 2024-01-31 | Stop reason: SDUPTHER

## 2023-10-25 DIAGNOSIS — J44.1 CHRONIC OBSTRUCTIVE PULMONARY DISEASE WITH ACUTE EXACERBATION (MULTI): ICD-10-CM

## 2023-11-15 DIAGNOSIS — J44.9 CHRONIC OBSTRUCTIVE PULMONARY DISEASE, UNSPECIFIED (MULTI): ICD-10-CM

## 2023-11-16 RX ORDER — ALBUTEROL SULFATE 90 UG/1
AEROSOL, METERED RESPIRATORY (INHALATION)
Qty: 18 G | Refills: 2 | Status: SHIPPED | OUTPATIENT
Start: 2023-11-16 | End: 2024-01-14

## 2023-12-06 ENCOUNTER — ANCILLARY PROCEDURE (OUTPATIENT)
Dept: RADIOLOGY | Facility: CLINIC | Age: 78
End: 2023-12-06
Payer: MEDICARE

## 2023-12-06 DIAGNOSIS — Z78.0 ASYMPTOMATIC MENOPAUSAL STATE: ICD-10-CM

## 2023-12-06 DIAGNOSIS — Z00.00 ENCOUNTER FOR GENERAL ADULT MEDICAL EXAMINATION WITHOUT ABNORMAL FINDINGS: ICD-10-CM

## 2023-12-06 PROCEDURE — 77080 DXA BONE DENSITY AXIAL: CPT

## 2023-12-06 PROCEDURE — 77080 DXA BONE DENSITY AXIAL: CPT | Performed by: RADIOLOGY

## 2023-12-07 ENCOUNTER — APPOINTMENT (OUTPATIENT)
Dept: RADIOLOGY | Facility: CLINIC | Age: 78
End: 2023-12-07
Payer: MEDICARE

## 2023-12-07 DIAGNOSIS — M81.0 OSTEOPOROSIS, UNSPECIFIED OSTEOPOROSIS TYPE, UNSPECIFIED PATHOLOGICAL FRACTURE PRESENCE: Primary | ICD-10-CM

## 2023-12-07 RX ORDER — ALENDRONATE SODIUM 70 MG/1
70 TABLET ORAL
Qty: 4 TABLET | Refills: 11 | Status: SHIPPED | OUTPATIENT
Start: 2023-12-07 | End: 2024-01-31 | Stop reason: SDUPTHER

## 2023-12-07 NOTE — RESULT ENCOUNTER NOTE
She has osteoporosis.  Recommend starting Fosamax 70 mg weekly and continue with calcium and vitamin D supplements.

## 2023-12-20 DIAGNOSIS — I10 ESSENTIAL HYPERTENSION: ICD-10-CM

## 2023-12-20 RX ORDER — AMLODIPINE BESYLATE 10 MG/1
10 TABLET ORAL DAILY
Qty: 90 TABLET | Refills: 0 | Status: SHIPPED | OUTPATIENT
Start: 2023-12-20 | End: 2024-01-31 | Stop reason: SDUPTHER

## 2024-01-14 DIAGNOSIS — J44.9 CHRONIC OBSTRUCTIVE PULMONARY DISEASE, UNSPECIFIED (MULTI): ICD-10-CM

## 2024-01-14 DIAGNOSIS — I48.91 ATRIAL FIBRILLATION, UNSPECIFIED TYPE (MULTI): ICD-10-CM

## 2024-01-14 RX ORDER — ALBUTEROL SULFATE 90 UG/1
AEROSOL, METERED RESPIRATORY (INHALATION)
Qty: 18 G | Refills: 2 | Status: SHIPPED | OUTPATIENT
Start: 2024-01-14 | End: 2024-01-31 | Stop reason: SDUPTHER

## 2024-01-14 RX ORDER — APIXABAN 5 MG/1
5 TABLET, FILM COATED ORAL 2 TIMES DAILY
Qty: 60 TABLET | Refills: 2 | Status: SHIPPED | OUTPATIENT
Start: 2024-01-14 | End: 2024-01-31 | Stop reason: SDUPTHER

## 2024-01-19 ENCOUNTER — APPOINTMENT (OUTPATIENT)
Dept: PRIMARY CARE | Facility: CLINIC | Age: 79
End: 2024-01-19
Payer: MEDICARE

## 2024-01-31 ENCOUNTER — OFFICE VISIT (OUTPATIENT)
Dept: PRIMARY CARE | Facility: CLINIC | Age: 79
End: 2024-01-31
Payer: MEDICARE

## 2024-01-31 VITALS — SYSTOLIC BLOOD PRESSURE: 110 MMHG | DIASTOLIC BLOOD PRESSURE: 60 MMHG

## 2024-01-31 DIAGNOSIS — M81.0 AGE RELATED OSTEOPOROSIS, UNSPECIFIED PATHOLOGICAL FRACTURE PRESENCE: Primary | ICD-10-CM

## 2024-01-31 DIAGNOSIS — Z00.00 ROUTINE HEALTH MAINTENANCE: ICD-10-CM

## 2024-01-31 DIAGNOSIS — I10 ESSENTIAL HYPERTENSION: ICD-10-CM

## 2024-01-31 DIAGNOSIS — I48.91 ATRIAL FIBRILLATION, UNSPECIFIED TYPE (MULTI): ICD-10-CM

## 2024-01-31 DIAGNOSIS — E78.01 FAMILIAL HYPERCHOLESTEREMIA: ICD-10-CM

## 2024-01-31 DIAGNOSIS — I10 HYPERTENSION, UNSPECIFIED TYPE: ICD-10-CM

## 2024-01-31 DIAGNOSIS — J44.9 CHRONIC OBSTRUCTIVE PULMONARY DISEASE, UNSPECIFIED (MULTI): ICD-10-CM

## 2024-01-31 DIAGNOSIS — J44.1 CHRONIC OBSTRUCTIVE PULMONARY DISEASE WITH ACUTE EXACERBATION (MULTI): ICD-10-CM

## 2024-01-31 DIAGNOSIS — G47.9 SLEEP DISTURBANCES: ICD-10-CM

## 2024-01-31 DIAGNOSIS — Z12.31 SCREENING MAMMOGRAM FOR BREAST CANCER: ICD-10-CM

## 2024-01-31 DIAGNOSIS — M81.0 OSTEOPOROSIS, UNSPECIFIED OSTEOPOROSIS TYPE, UNSPECIFIED PATHOLOGICAL FRACTURE PRESENCE: ICD-10-CM

## 2024-01-31 PROCEDURE — 3078F DIAST BP <80 MM HG: CPT | Performed by: INTERNAL MEDICINE

## 2024-01-31 PROCEDURE — 99214 OFFICE O/P EST MOD 30 MIN: CPT | Performed by: INTERNAL MEDICINE

## 2024-01-31 PROCEDURE — 3074F SYST BP LT 130 MM HG: CPT | Performed by: INTERNAL MEDICINE

## 2024-01-31 RX ORDER — ALBUTEROL SULFATE 90 UG/1
AEROSOL, METERED RESPIRATORY (INHALATION)
Qty: 18 G | Refills: 2 | Status: SHIPPED | OUTPATIENT
Start: 2024-01-31 | End: 2024-06-05 | Stop reason: SDUPTHER

## 2024-01-31 RX ORDER — IPRATROPIUM BROMIDE AND ALBUTEROL SULFATE 2.5; .5 MG/3ML; MG/3ML
3 SOLUTION RESPIRATORY (INHALATION)
Qty: 180 ML | Refills: 1 | Status: SHIPPED | OUTPATIENT
Start: 2024-01-31 | End: 2024-03-01

## 2024-01-31 RX ORDER — CALCIUM CARBONATE 200(500)MG
3 TABLET,CHEWABLE ORAL DAILY
Qty: 90 TABLET | Refills: 11 | Status: SHIPPED | OUTPATIENT
Start: 2024-01-31 | End: 2024-06-05 | Stop reason: SDUPTHER

## 2024-01-31 RX ORDER — ATORVASTATIN CALCIUM 20 MG/1
20 TABLET, FILM COATED ORAL EVERY EVENING
Qty: 90 TABLET | Refills: 0 | Status: SHIPPED | OUTPATIENT
Start: 2024-01-31 | End: 2024-06-03

## 2024-01-31 RX ORDER — ACETAMINOPHEN 500 MG
2000 TABLET ORAL DAILY
Qty: 30 CAPSULE | Refills: 2 | Status: SHIPPED | OUTPATIENT
Start: 2024-01-31 | End: 2024-03-01

## 2024-01-31 RX ORDER — ALENDRONATE SODIUM 70 MG/1
70 TABLET ORAL
Qty: 4 TABLET | Refills: 11 | Status: SHIPPED | OUTPATIENT
Start: 2024-01-31 | End: 2024-06-05 | Stop reason: SDUPTHER

## 2024-01-31 RX ORDER — DORZOLAMIDE HCL 20 MG/ML
1 SOLUTION/ DROPS OPHTHALMIC 3 TIMES DAILY
Qty: 30 ML | Refills: 1 | Status: SHIPPED | OUTPATIENT
Start: 2024-01-31 | End: 2024-03-05

## 2024-01-31 RX ORDER — FLUTICASONE FUROATE AND VILANTEROL 100; 25 UG/1; UG/1
1 POWDER RESPIRATORY (INHALATION) DAILY
Qty: 60 EACH | Refills: 2 | Status: SHIPPED | OUTPATIENT
Start: 2024-01-31 | End: 2024-06-05 | Stop reason: SDUPTHER

## 2024-01-31 RX ORDER — AMLODIPINE BESYLATE 10 MG/1
5 TABLET ORAL DAILY
Qty: 90 TABLET | Refills: 0 | Status: SHIPPED | OUTPATIENT
Start: 2024-01-31 | End: 2024-03-19

## 2024-01-31 RX ORDER — TIMOLOL MALEATE 5 MG/ML
1 SOLUTION/ DROPS OPHTHALMIC DAILY
Qty: 1.5 ML | Refills: 0 | Status: SHIPPED | OUTPATIENT
Start: 2024-01-31 | End: 2024-06-05

## 2024-01-31 RX ORDER — METOPROLOL TARTRATE 25 MG/1
25 TABLET, FILM COATED ORAL 2 TIMES DAILY
Qty: 180 TABLET | Refills: 0 | Status: SHIPPED | OUTPATIENT
Start: 2024-01-31 | End: 2024-04-11

## 2024-01-31 ASSESSMENT — PATIENT HEALTH QUESTIONNAIRE - PHQ9
1. LITTLE INTEREST OR PLEASURE IN DOING THINGS: SEVERAL DAYS
SUM OF ALL RESPONSES TO PHQ9 QUESTIONS 1 AND 2: 2
2. FEELING DOWN, DEPRESSED OR HOPELESS: SEVERAL DAYS

## 2024-01-31 NOTE — PROGRESS NOTES
Subjective   Patient ID: Mary Ann Forde is a 78 y.o. female who presents for Follow-up.  Here for follow up.   Denies previous hx of presycnope   ROS negative for HA, blurred vision, presycnope/sycnope, LE edema, orthopnea, BOOTHE, abd pain. Changes in BM or urination.   Does wake up at night frequently, snores at night, takes naps during the day.         Review of Systems   All other systems reviewed and are negative.      Objective   Physical Exam  Vitals reviewed.   Constitutional:       Appearance: Normal appearance.   Cardiovascular:      Rate and Rhythm: Normal rate and regular rhythm.      Pulses: Normal pulses.      Heart sounds: Normal heart sounds.   Pulmonary:      Effort: Pulmonary effort is normal.      Breath sounds: Normal breath sounds.   Abdominal:      General: Abdomen is flat. Bowel sounds are normal.      Palpations: Abdomen is soft.   Neurological:      Mental Status: She is alert.       /60      Assessment/Plan   Problem List Items Addressed This Visit    None       Patient is a 77 year old woman with HTN, HLD, COPD, osteoperosis, KEVIN, and atrial fibrillation on anticoagulation.      #HTN with orthostasis   Well controlled  Continue current regimen with hold parameters  Keep BP log, call with results     #obesity   -refused dietary referral, educated on diet, continue exercise on bike.     #afib  Metoprolol, eliquis     #HLD   Continue statin     #COPD   Continue inhalers     #osteoporosis   -vit d-ca  -alendronate    # Routine health maintenance  Labs during wellness in 4 months   Mammo due   Vaccines UTD     RTC for wellness in 4 months

## 2024-02-20 ENCOUNTER — APPOINTMENT (OUTPATIENT)
Dept: RADIOLOGY | Facility: CLINIC | Age: 79
End: 2024-02-20
Payer: MEDICARE

## 2024-02-29 ENCOUNTER — APPOINTMENT (OUTPATIENT)
Dept: RADIOLOGY | Facility: CLINIC | Age: 79
End: 2024-02-29
Payer: MEDICARE

## 2024-03-05 ENCOUNTER — HOSPITAL ENCOUNTER (OUTPATIENT)
Dept: RADIOLOGY | Facility: CLINIC | Age: 79
Discharge: HOME | End: 2024-03-05
Payer: MEDICARE

## 2024-03-05 VITALS — WEIGHT: 240.3 LBS | BODY MASS INDEX: 35.59 KG/M2 | HEIGHT: 69 IN

## 2024-03-05 DIAGNOSIS — Z00.00 ROUTINE HEALTH MAINTENANCE: ICD-10-CM

## 2024-03-05 DIAGNOSIS — Z12.31 SCREENING MAMMOGRAM FOR BREAST CANCER: ICD-10-CM

## 2024-03-05 PROCEDURE — 77063 BREAST TOMOSYNTHESIS BI: CPT | Performed by: RADIOLOGY

## 2024-03-05 PROCEDURE — 77067 SCR MAMMO BI INCL CAD: CPT

## 2024-03-05 PROCEDURE — 77067 SCR MAMMO BI INCL CAD: CPT | Performed by: RADIOLOGY

## 2024-03-05 RX ORDER — DORZOLAMIDE HCL 20 MG/ML
1 SOLUTION/ DROPS OPHTHALMIC 3 TIMES DAILY
Qty: 30 ML | Refills: 1 | Status: SHIPPED | OUTPATIENT
Start: 2024-03-05 | End: 2024-06-05 | Stop reason: SDUPTHER

## 2024-03-18 DIAGNOSIS — I10 ESSENTIAL HYPERTENSION: ICD-10-CM

## 2024-03-19 RX ORDER — AMLODIPINE BESYLATE 10 MG/1
10 TABLET ORAL DAILY
Qty: 90 TABLET | Refills: 0 | Status: SHIPPED | OUTPATIENT
Start: 2024-03-19 | End: 2024-06-05 | Stop reason: SDUPTHER

## 2024-04-11 DIAGNOSIS — I10 HYPERTENSION, UNSPECIFIED TYPE: ICD-10-CM

## 2024-04-11 RX ORDER — METOPROLOL TARTRATE 25 MG/1
25 TABLET, FILM COATED ORAL 2 TIMES DAILY
Qty: 180 TABLET | Refills: 0 | Status: SHIPPED | OUTPATIENT
Start: 2024-04-11 | End: 2024-06-05 | Stop reason: SDUPTHER

## 2024-05-15 DIAGNOSIS — I48.91 ATRIAL FIBRILLATION, UNSPECIFIED TYPE (MULTI): ICD-10-CM

## 2024-05-15 RX ORDER — APIXABAN 5 MG/1
5 TABLET, FILM COATED ORAL 2 TIMES DAILY
Qty: 180 TABLET | Refills: 0 | Status: SHIPPED | OUTPATIENT
Start: 2024-05-15 | End: 2024-06-05 | Stop reason: SDUPTHER

## 2024-05-29 ENCOUNTER — APPOINTMENT (OUTPATIENT)
Dept: PRIMARY CARE | Facility: CLINIC | Age: 79
End: 2024-05-29
Payer: MEDICARE

## 2024-06-03 DIAGNOSIS — E78.01 FAMILIAL HYPERCHOLESTEREMIA: ICD-10-CM

## 2024-06-03 RX ORDER — ATORVASTATIN CALCIUM 20 MG/1
20 TABLET, FILM COATED ORAL EVERY EVENING
Qty: 90 TABLET | Refills: 0 | Status: SHIPPED | OUTPATIENT
Start: 2024-06-03 | End: 2024-06-05 | Stop reason: SDUPTHER

## 2024-06-05 ENCOUNTER — OFFICE VISIT (OUTPATIENT)
Dept: PRIMARY CARE | Facility: CLINIC | Age: 79
End: 2024-06-05
Payer: MEDICARE

## 2024-06-05 ENCOUNTER — LAB (OUTPATIENT)
Dept: LAB | Facility: LAB | Age: 79
End: 2024-06-05
Payer: MEDICARE

## 2024-06-05 VITALS — DIASTOLIC BLOOD PRESSURE: 70 MMHG | SYSTOLIC BLOOD PRESSURE: 110 MMHG | BODY MASS INDEX: 35.57 KG/M2 | WEIGHT: 241 LBS

## 2024-06-05 DIAGNOSIS — M81.0 OSTEOPOROSIS, UNSPECIFIED OSTEOPOROSIS TYPE, UNSPECIFIED PATHOLOGICAL FRACTURE PRESENCE: ICD-10-CM

## 2024-06-05 DIAGNOSIS — E08.36: ICD-10-CM

## 2024-06-05 DIAGNOSIS — E78.01 FAMILIAL HYPERCHOLESTEREMIA: ICD-10-CM

## 2024-06-05 DIAGNOSIS — Z00.00 ROUTINE HEALTH MAINTENANCE: ICD-10-CM

## 2024-06-05 DIAGNOSIS — I10 HYPERTENSION, UNSPECIFIED TYPE: ICD-10-CM

## 2024-06-05 DIAGNOSIS — I48.91 ATRIAL FIBRILLATION, UNSPECIFIED TYPE (MULTI): ICD-10-CM

## 2024-06-05 DIAGNOSIS — M81.0 AGE RELATED OSTEOPOROSIS, UNSPECIFIED PATHOLOGICAL FRACTURE PRESENCE: ICD-10-CM

## 2024-06-05 DIAGNOSIS — I10 ESSENTIAL HYPERTENSION: ICD-10-CM

## 2024-06-05 DIAGNOSIS — J44.9 CHRONIC OBSTRUCTIVE PULMONARY DISEASE, UNSPECIFIED (MULTI): ICD-10-CM

## 2024-06-05 LAB
25(OH)D3 SERPL-MCNC: 55 NG/ML (ref 30–100)
ALBUMIN SERPL BCP-MCNC: 4.1 G/DL (ref 3.4–5)
ALP SERPL-CCNC: 73 U/L (ref 33–136)
ALT SERPL W P-5'-P-CCNC: 10 U/L (ref 7–45)
ANION GAP SERPL CALC-SCNC: 14 MMOL/L (ref 10–20)
AST SERPL W P-5'-P-CCNC: 11 U/L (ref 9–39)
BILIRUB SERPL-MCNC: 1 MG/DL (ref 0–1.2)
BUN SERPL-MCNC: 10 MG/DL (ref 6–23)
CALCIUM SERPL-MCNC: 9.8 MG/DL (ref 8.6–10.6)
CHLORIDE SERPL-SCNC: 103 MMOL/L (ref 98–107)
CHOLEST SERPL-MCNC: 135 MG/DL (ref 0–199)
CHOLESTEROL/HDL RATIO: 3.1
CO2 SERPL-SCNC: 30 MMOL/L (ref 21–32)
CREAT SERPL-MCNC: 0.53 MG/DL (ref 0.5–1.05)
CREAT UR-MCNC: 112.9 MG/DL (ref 20–320)
EGFRCR SERPLBLD CKD-EPI 2021: >90 ML/MIN/1.73M*2
ERYTHROCYTE [DISTWIDTH] IN BLOOD BY AUTOMATED COUNT: 13.5 % (ref 11.5–14.5)
EST. AVERAGE GLUCOSE BLD GHB EST-MCNC: 120 MG/DL
GLUCOSE SERPL-MCNC: 107 MG/DL (ref 74–99)
HBA1C MFR BLD: 5.8 %
HCT VFR BLD AUTO: 37.5 % (ref 36–46)
HCV AB SER QL: NONREACTIVE
HDLC SERPL-MCNC: 43.7 MG/DL
HGB BLD-MCNC: 12.1 G/DL (ref 12–16)
LDLC SERPL CALC-MCNC: 78 MG/DL
MCH RBC QN AUTO: 31 PG (ref 26–34)
MCHC RBC AUTO-ENTMCNC: 32.3 G/DL (ref 32–36)
MCV RBC AUTO: 96 FL (ref 80–100)
MICROALBUMIN UR-MCNC: 7.2 MG/L
MICROALBUMIN/CREAT UR: 6.4 UG/MG CREAT
NON HDL CHOLESTEROL: 91 MG/DL (ref 0–149)
NRBC BLD-RTO: 0 /100 WBCS (ref 0–0)
PLATELET # BLD AUTO: 246 X10*3/UL (ref 150–450)
POTASSIUM SERPL-SCNC: 4.1 MMOL/L (ref 3.5–5.3)
PROT SERPL-MCNC: 7.4 G/DL (ref 6.4–8.2)
RBC # BLD AUTO: 3.9 X10*6/UL (ref 4–5.2)
SODIUM SERPL-SCNC: 143 MMOL/L (ref 136–145)
TRIGL SERPL-MCNC: 66 MG/DL (ref 0–149)
TSH SERPL-ACNC: 0.78 MIU/L (ref 0.44–3.98)
VLDL: 13 MG/DL (ref 0–40)
WBC # BLD AUTO: 5.9 X10*3/UL (ref 4.4–11.3)

## 2024-06-05 PROCEDURE — 99214 OFFICE O/P EST MOD 30 MIN: CPT | Performed by: INTERNAL MEDICINE

## 2024-06-05 PROCEDURE — 82570 ASSAY OF URINE CREATININE: CPT

## 2024-06-05 PROCEDURE — 1170F FXNL STATUS ASSESSED: CPT | Performed by: INTERNAL MEDICINE

## 2024-06-05 PROCEDURE — 3074F SYST BP LT 130 MM HG: CPT | Performed by: INTERNAL MEDICINE

## 2024-06-05 PROCEDURE — 82043 UR ALBUMIN QUANTITATIVE: CPT

## 2024-06-05 PROCEDURE — 83036 HEMOGLOBIN GLYCOSYLATED A1C: CPT

## 2024-06-05 PROCEDURE — 84443 ASSAY THYROID STIM HORMONE: CPT

## 2024-06-05 PROCEDURE — 82306 VITAMIN D 25 HYDROXY: CPT

## 2024-06-05 PROCEDURE — 85027 COMPLETE CBC AUTOMATED: CPT

## 2024-06-05 PROCEDURE — 80061 LIPID PANEL: CPT

## 2024-06-05 PROCEDURE — 3078F DIAST BP <80 MM HG: CPT | Performed by: INTERNAL MEDICINE

## 2024-06-05 PROCEDURE — 1124F ACP DISCUSS-NO DSCNMKR DOCD: CPT | Performed by: INTERNAL MEDICINE

## 2024-06-05 PROCEDURE — G0439 PPPS, SUBSEQ VISIT: HCPCS | Performed by: INTERNAL MEDICINE

## 2024-06-05 PROCEDURE — 36415 COLL VENOUS BLD VENIPUNCTURE: CPT

## 2024-06-05 PROCEDURE — 80053 COMPREHEN METABOLIC PANEL: CPT

## 2024-06-05 PROCEDURE — 1036F TOBACCO NON-USER: CPT | Performed by: INTERNAL MEDICINE

## 2024-06-05 PROCEDURE — 86803 HEPATITIS C AB TEST: CPT

## 2024-06-05 PROCEDURE — 1159F MED LIST DOCD IN RCRD: CPT | Performed by: INTERNAL MEDICINE

## 2024-06-05 RX ORDER — FLUTICASONE FUROATE AND VILANTEROL 100; 25 UG/1; UG/1
1 POWDER RESPIRATORY (INHALATION) DAILY
Qty: 60 EACH | Refills: 2 | Status: SHIPPED | OUTPATIENT
Start: 2024-06-05

## 2024-06-05 RX ORDER — CALCIUM CARBONATE 200(500)MG
3 TABLET,CHEWABLE ORAL DAILY
Qty: 90 TABLET | Refills: 11 | Status: SHIPPED | OUTPATIENT
Start: 2024-06-05 | End: 2025-06-05

## 2024-06-05 RX ORDER — TIMOLOL MALEATE 5 MG/ML
1 SOLUTION/ DROPS OPHTHALMIC DAILY
Qty: 1.5 ML | Refills: 0 | Status: SHIPPED | OUTPATIENT
Start: 2024-06-05 | End: 2024-07-05

## 2024-06-05 RX ORDER — ATORVASTATIN CALCIUM 20 MG/1
20 TABLET, FILM COATED ORAL EVERY EVENING
Qty: 90 TABLET | Refills: 0 | Status: SHIPPED | OUTPATIENT
Start: 2024-06-05

## 2024-06-05 RX ORDER — DORZOLAMIDE HCL 20 MG/ML
1 SOLUTION/ DROPS OPHTHALMIC 3 TIMES DAILY
Qty: 30 ML | Refills: 1 | Status: SHIPPED | OUTPATIENT
Start: 2024-06-05 | End: 2025-07-10

## 2024-06-05 RX ORDER — AMLODIPINE BESYLATE 10 MG/1
10 TABLET ORAL DAILY
Qty: 90 TABLET | Refills: 0 | Status: SHIPPED | OUTPATIENT
Start: 2024-06-05

## 2024-06-05 RX ORDER — METOPROLOL TARTRATE 25 MG/1
25 TABLET, FILM COATED ORAL 2 TIMES DAILY
Qty: 180 TABLET | Refills: 0 | Status: SHIPPED | OUTPATIENT
Start: 2024-06-05

## 2024-06-05 RX ORDER — ALENDRONATE SODIUM 70 MG/1
70 TABLET ORAL
Qty: 4 TABLET | Refills: 11 | Status: SHIPPED | OUTPATIENT
Start: 2024-06-05 | End: 2025-06-05

## 2024-06-05 RX ORDER — ALBUTEROL SULFATE 90 UG/1
AEROSOL, METERED RESPIRATORY (INHALATION)
Qty: 18 G | Refills: 2 | Status: SHIPPED | OUTPATIENT
Start: 2024-06-05

## 2024-06-05 ASSESSMENT — ACTIVITIES OF DAILY LIVING (ADL)
DRESSING: INDEPENDENT
DOING_HOUSEWORK: INDEPENDENT
TAKING_MEDICATION: INDEPENDENT
BATHING: INDEPENDENT
MANAGING_FINANCES: INDEPENDENT
GROCERY_SHOPPING: INDEPENDENT

## 2024-06-05 ASSESSMENT — PATIENT HEALTH QUESTIONNAIRE - PHQ9
2. FEELING DOWN, DEPRESSED OR HOPELESS: SEVERAL DAYS
1. LITTLE INTEREST OR PLEASURE IN DOING THINGS: SEVERAL DAYS
10. IF YOU CHECKED OFF ANY PROBLEMS, HOW DIFFICULT HAVE THESE PROBLEMS MADE IT FOR YOU TO DO YOUR WORK, TAKE CARE OF THINGS AT HOME, OR GET ALONG WITH OTHER PEOPLE: SOMEWHAT DIFFICULT
SUM OF ALL RESPONSES TO PHQ9 QUESTIONS 1 AND 2: 2

## 2024-06-05 NOTE — PROGRESS NOTES
I reviewed the resident/fellow's documentation and discussed the patient with the resident/fellow. I agree with the resident/fellow's medical decision making as documented in the note.  As the attending physician, I certify that I personally reviewed the patient's history and personally examined the patient to confirm the physical findings described above, and that I reviewed the relevant imaging studies and available reports. I also discussed the differential diagnosis and all of the proposed management plans with the patient and individuals accompanying the patient to this visit. They had the opportunity to ask questions about the proposed management plans and to have those questions answered.     Coretta Silver MD

## 2024-06-05 NOTE — PROGRESS NOTES
Medicare Wellness Billing Compliance Satisfied    *This is a visual tool to show completion of required items on the day of the visit. Green checks will only appear on the date of visit.    Review all medications by prescribing practitioner or clinical pharmacist (such as prescriptions, OTCs, herbal therapies and supplements) documented in the medical record    Past Medical, Surgical, and Family History reviewed and updated in chart    Tobacco Use Reviewed    Alcohol Use Reviewed    Illicit Drug Use Reviewed    PHQ2/9    Falls in Last Year Reviewed    Home Safety Risk Factors Reviewed    Cognitive Impairment Reviewed    Patient Self Assessment and Health Status    Current Diet Reviewed    Exercise Frequency    ADL - Hearing Impairment    ADL - Bathing    ADL - Dressing    ADL - Walks in Home    IADL - Managing Finances    IADL - Grocery Shopping    IADL - Taking Medications    IADL - Doing Housework    Chief Complaint:   Chief Complaint   Patient presents with    Medicare Annual Wellness Visit Subsequent    Med Refill      Subjective     HPI    78 y.o. female with a PMH significant for hypertension, hyperlipidemia, COPD, osteoporosis, obstructive sleep apnea on CPAP on anticoagulation presents to the clinic for wellness.  She has been under a lot of stress lately secondary to her losing her brother as well as her son in the past month.  Blood pressure well-controlled at 110/70.  She continues to use oxygen supplementation 3.5 L while at home or after a long walk.  Other than that no new complaints or concerns at this time.  Review of systems otherwise negative.      Objective    Visit Vitals  /70      BMI Readings from Last 15 Encounters:   06/05/24 35.57 kg/m²   03/05/24 35.47 kg/m²   10/03/23 35.44 kg/m²   06/27/23 34.70 kg/m²   04/12/23 36.03 kg/m²   02/24/23 36.48 kg/m²   02/08/23 36.03 kg/m²   11/09/22 37.07 kg/m²   04/05/22 38.69 kg/m²   11/30/21 38.69 kg/m²   11/05/21 38.69 kg/m²    09/21/21 38.69 kg/m²   07/13/21 38.54 kg/m²   11/16/20 37.36 kg/m²   09/30/20 36.37 kg/m²      Lab Results   Component Value Date    HGBA1C 5.6 10/03/2023      Lab Results   Component Value Date    HGBA1C 5.6 10/03/2023    HGBA1C 6.3 (A) 06/07/2023    HGBA1C 5.5 11/09/2022     Lab Results   Component Value Date    LDLCALC 64 (L) 10/03/2023    CREATININE 0.53 06/07/2023      Lab Results   Component Value Date    WBC 8.0 06/07/2023    HGB 11.2 (L) 06/07/2023    HCT 37.1 06/07/2023    MCV 99 06/07/2023     06/07/2023        Chemistry    Lab Results   Component Value Date/Time     06/07/2023 1454    K 3.8 06/07/2023 1454     06/07/2023 1454    CO2 32 06/07/2023 1454    BUN 7 06/07/2023 1454    CREATININE 0.53 06/07/2023 1454    Lab Results   Component Value Date/Time    CALCIUM 9.7 06/07/2023 1454    ALKPHOS 82 06/07/2023 1454    AST 12 06/07/2023 1454    ALT 10 06/07/2023 1454    BILITOT 1.0 06/07/2023 1454           No images are attached to the encounter.   [unfilled]       Physical Exam   Gen: well appearing, in NAD  HEENT: AT/NC, no conjunctival pallor, anicteric sclera, EOMI   Neck: supple, no LAD/JVD  Chest: Good air entry b/l, no adventitious sounds heard  CVS: s1 & S2 only, no MGR  Abd: soft, flat, NT/ND, BS+  Ext: no cyanosis/clubbing or pedal edema  Neuro: Aox3, cn 2-12 intact, motor 5/5 globally, sensation intact    Assessment/Plan    The following medical issues were discussed during this encounter  :   #Hypertension  -Well-controlled and stable  -Continue taking amlodipine  #A-fib  -Continue taking metoprolol 25 mg twice a day  -Continue taking apixaban 5 mg twice daily  #COPD  -Continue taking albuterol  -Continue taking Breo Ellipta  -Continue using oxygen supplementation lower 3.5 L  #Glaucoma   -Continue taking timolol as well as dorzolamide prescribed by her ophthalmologist  #Osteoporosis/osteopenia  -Continue taking Fosamax 70 mg every 7 days      The following labs were ordered  to be completed before the patient's next visit: Vitamin D, lipid, CMP, CBC, urine albumin, TSH T4        Immunizations: UTD        Please return in 3 months or if symptoms worsen     Nam Robison MD

## 2024-06-13 ENCOUNTER — TELEPHONE (OUTPATIENT)
Dept: PRIMARY CARE | Facility: CLINIC | Age: 79
End: 2024-06-13
Payer: MEDICARE

## 2024-07-11 DIAGNOSIS — Z00.00 ROUTINE HEALTH MAINTENANCE: ICD-10-CM

## 2024-07-11 RX ORDER — TIMOLOL MALEATE 5 MG/ML
1 SOLUTION/ DROPS OPHTHALMIC DAILY
Qty: 5 ML | Refills: 0 | Status: SHIPPED | OUTPATIENT
Start: 2024-07-11 | End: 2024-08-10

## 2024-09-12 DIAGNOSIS — J44.9 CHRONIC OBSTRUCTIVE PULMONARY DISEASE, UNSPECIFIED (MULTI): ICD-10-CM

## 2024-09-12 RX ORDER — ALBUTEROL SULFATE 90 UG/1
INHALANT RESPIRATORY (INHALATION)
Qty: 18 G | Refills: 2 | Status: SHIPPED | OUTPATIENT
Start: 2024-09-12

## 2024-09-24 ENCOUNTER — APPOINTMENT (OUTPATIENT)
Dept: RADIOLOGY | Facility: HOSPITAL | Age: 79
End: 2024-09-24
Payer: MEDICARE

## 2024-09-24 ENCOUNTER — APPOINTMENT (OUTPATIENT)
Dept: ORTHOPEDIC SURGERY | Facility: CLINIC | Age: 79
End: 2024-09-24
Payer: MEDICARE

## 2024-09-24 ENCOUNTER — APPOINTMENT (OUTPATIENT)
Dept: PRIMARY CARE | Facility: CLINIC | Age: 79
End: 2024-09-24
Payer: MEDICARE

## 2024-09-24 ENCOUNTER — APPOINTMENT (OUTPATIENT)
Dept: CARDIOLOGY | Facility: HOSPITAL | Age: 79
End: 2024-09-24
Payer: MEDICARE

## 2024-09-24 ENCOUNTER — HOSPITAL ENCOUNTER (INPATIENT)
Facility: HOSPITAL | Age: 79
LOS: 3 days | Discharge: HOME | End: 2024-09-27
Attending: STUDENT IN AN ORGANIZED HEALTH CARE EDUCATION/TRAINING PROGRAM | Admitting: HOSPITALIST
Payer: MEDICARE

## 2024-09-24 VITALS
OXYGEN SATURATION: 81 % | WEIGHT: 243 LBS | SYSTOLIC BLOOD PRESSURE: 89 MMHG | BODY MASS INDEX: 35.87 KG/M2 | DIASTOLIC BLOOD PRESSURE: 61 MMHG

## 2024-09-24 DIAGNOSIS — J18.9 PNEUMONIA OF BOTH LUNGS DUE TO INFECTIOUS ORGANISM, UNSPECIFIED PART OF LUNG: ICD-10-CM

## 2024-09-24 DIAGNOSIS — R06.00 ACUTE DYSPNEA: Primary | ICD-10-CM

## 2024-09-24 DIAGNOSIS — R09.02 HYPOXIA: ICD-10-CM

## 2024-09-24 DIAGNOSIS — R06.02 SHORTNESS OF BREATH: Primary | ICD-10-CM

## 2024-09-24 DIAGNOSIS — J44.1 COPD EXACERBATION (MULTI): ICD-10-CM

## 2024-09-24 DIAGNOSIS — R06.00 ACUTE DYSPNEA: ICD-10-CM

## 2024-09-24 DIAGNOSIS — I10 ESSENTIAL HYPERTENSION: ICD-10-CM

## 2024-09-24 LAB
ANION GAP SERPL CALC-SCNC: 12 MMOL/L (ref 10–20)
BASOPHILS # BLD AUTO: 0.05 X10*3/UL (ref 0–0.1)
BASOPHILS NFR BLD AUTO: 0.7 %
BNP SERPL-MCNC: 184 PG/ML (ref 0–99)
BUN SERPL-MCNC: 10 MG/DL (ref 6–23)
CALCIUM SERPL-MCNC: 9.2 MG/DL (ref 8.6–10.3)
CARDIAC TROPONIN I PNL SERPL HS: 8 NG/L (ref 0–13)
CARDIAC TROPONIN I PNL SERPL HS: 8 NG/L (ref 0–13)
CHLORIDE SERPL-SCNC: 103 MMOL/L (ref 98–107)
CO2 SERPL-SCNC: 29 MMOL/L (ref 21–32)
CREAT SERPL-MCNC: 0.55 MG/DL (ref 0.5–1.05)
EGFRCR SERPLBLD CKD-EPI 2021: >90 ML/MIN/1.73M*2
EOSINOPHIL # BLD AUTO: 0.78 X10*3/UL (ref 0–0.4)
EOSINOPHIL NFR BLD AUTO: 11 %
ERYTHROCYTE [DISTWIDTH] IN BLOOD BY AUTOMATED COUNT: 13.2 % (ref 11.5–14.5)
FLUAV RNA RESP QL NAA+PROBE: NOT DETECTED
FLUBV RNA RESP QL NAA+PROBE: NOT DETECTED
GLUCOSE SERPL-MCNC: 103 MG/DL (ref 74–99)
HCT VFR BLD AUTO: 39.3 % (ref 36–46)
HGB BLD-MCNC: 12.5 G/DL (ref 12–16)
IMM GRANULOCYTES # BLD AUTO: 0.04 X10*3/UL (ref 0–0.5)
IMM GRANULOCYTES NFR BLD AUTO: 0.6 % (ref 0–0.9)
LYMPHOCYTES # BLD AUTO: 1.9 X10*3/UL (ref 0.8–3)
LYMPHOCYTES NFR BLD AUTO: 26.9 %
MAGNESIUM SERPL-MCNC: 1.6 MG/DL (ref 1.6–2.4)
MCH RBC QN AUTO: 31.6 PG (ref 26–34)
MCHC RBC AUTO-ENTMCNC: 31.8 G/DL (ref 32–36)
MCV RBC AUTO: 100 FL (ref 80–100)
MONOCYTES # BLD AUTO: 0.84 X10*3/UL (ref 0.05–0.8)
MONOCYTES NFR BLD AUTO: 11.9 %
NEUTROPHILS # BLD AUTO: 3.45 X10*3/UL (ref 1.6–5.5)
NEUTROPHILS NFR BLD AUTO: 48.9 %
NRBC BLD-RTO: 0 /100 WBCS (ref 0–0)
PLATELET # BLD AUTO: 259 X10*3/UL (ref 150–450)
POTASSIUM SERPL-SCNC: 4.3 MMOL/L (ref 3.5–5.3)
RBC # BLD AUTO: 3.95 X10*6/UL (ref 4–5.2)
SARS-COV-2 RNA RESP QL NAA+PROBE: NOT DETECTED
SODIUM SERPL-SCNC: 140 MMOL/L (ref 136–145)
WBC # BLD AUTO: 7.1 X10*3/UL (ref 4.4–11.3)

## 2024-09-24 PROCEDURE — 71046 X-RAY EXAM CHEST 2 VIEWS: CPT

## 2024-09-24 PROCEDURE — 99215 OFFICE O/P EST HI 40 MIN: CPT | Performed by: INTERNAL MEDICINE

## 2024-09-24 PROCEDURE — 83735 ASSAY OF MAGNESIUM: CPT | Performed by: STUDENT IN AN ORGANIZED HEALTH CARE EDUCATION/TRAINING PROGRAM

## 2024-09-24 PROCEDURE — 94640 AIRWAY INHALATION TREATMENT: CPT

## 2024-09-24 PROCEDURE — 2500000004 HC RX 250 GENERAL PHARMACY W/ HCPCS (ALT 636 FOR OP/ED): Performed by: STUDENT IN AN ORGANIZED HEALTH CARE EDUCATION/TRAINING PROGRAM

## 2024-09-24 PROCEDURE — 84484 ASSAY OF TROPONIN QUANT: CPT | Performed by: STUDENT IN AN ORGANIZED HEALTH CARE EDUCATION/TRAINING PROGRAM

## 2024-09-24 PROCEDURE — 71275 CT ANGIOGRAPHY CHEST: CPT | Mod: FOREIGN READ | Performed by: RADIOLOGY

## 2024-09-24 PROCEDURE — 2500000001 HC RX 250 WO HCPCS SELF ADMINISTERED DRUGS (ALT 637 FOR MEDICARE OP): Performed by: NURSE PRACTITIONER

## 2024-09-24 PROCEDURE — 99223 1ST HOSP IP/OBS HIGH 75: CPT | Performed by: NURSE PRACTITIONER

## 2024-09-24 PROCEDURE — 36415 COLL VENOUS BLD VENIPUNCTURE: CPT | Performed by: STUDENT IN AN ORGANIZED HEALTH CARE EDUCATION/TRAINING PROGRAM

## 2024-09-24 PROCEDURE — 2500000002 HC RX 250 W HCPCS SELF ADMINISTERED DRUGS (ALT 637 FOR MEDICARE OP, ALT 636 FOR OP/ED): Performed by: NURSE PRACTITIONER

## 2024-09-24 PROCEDURE — 93005 ELECTROCARDIOGRAM TRACING: CPT

## 2024-09-24 PROCEDURE — 2500000004 HC RX 250 GENERAL PHARMACY W/ HCPCS (ALT 636 FOR OP/ED): Performed by: NURSE PRACTITIONER

## 2024-09-24 PROCEDURE — 3078F DIAST BP <80 MM HG: CPT | Performed by: INTERNAL MEDICINE

## 2024-09-24 PROCEDURE — 80048 BASIC METABOLIC PNL TOTAL CA: CPT | Performed by: STUDENT IN AN ORGANIZED HEALTH CARE EDUCATION/TRAINING PROGRAM

## 2024-09-24 PROCEDURE — 87636 SARSCOV2 & INF A&B AMP PRB: CPT | Performed by: STUDENT IN AN ORGANIZED HEALTH CARE EDUCATION/TRAINING PROGRAM

## 2024-09-24 PROCEDURE — 83880 ASSAY OF NATRIURETIC PEPTIDE: CPT | Performed by: STUDENT IN AN ORGANIZED HEALTH CARE EDUCATION/TRAINING PROGRAM

## 2024-09-24 PROCEDURE — 71046 X-RAY EXAM CHEST 2 VIEWS: CPT | Mod: FOREIGN READ | Performed by: RADIOLOGY

## 2024-09-24 PROCEDURE — 85025 COMPLETE CBC W/AUTO DIFF WBC: CPT | Performed by: STUDENT IN AN ORGANIZED HEALTH CARE EDUCATION/TRAINING PROGRAM

## 2024-09-24 PROCEDURE — 3074F SYST BP LT 130 MM HG: CPT | Performed by: INTERNAL MEDICINE

## 2024-09-24 PROCEDURE — 99291 CRITICAL CARE FIRST HOUR: CPT

## 2024-09-24 PROCEDURE — 2550000001 HC RX 255 CONTRASTS: Performed by: STUDENT IN AN ORGANIZED HEALTH CARE EDUCATION/TRAINING PROGRAM

## 2024-09-24 PROCEDURE — 1210000001 HC SEMI-PRIVATE ROOM DAILY

## 2024-09-24 PROCEDURE — 71275 CT ANGIOGRAPHY CHEST: CPT

## 2024-09-24 RX ORDER — CEFTRIAXONE 1 G/50ML
1 INJECTION, SOLUTION INTRAVENOUS ONCE
Status: COMPLETED | OUTPATIENT
Start: 2024-09-24 | End: 2024-09-24

## 2024-09-24 RX ORDER — METOPROLOL TARTRATE 25 MG/1
25 TABLET, FILM COATED ORAL 2 TIMES DAILY
Status: DISCONTINUED | OUTPATIENT
Start: 2024-09-24 | End: 2024-09-27 | Stop reason: HOSPADM

## 2024-09-24 RX ORDER — PREDNISONE 20 MG/1
40 TABLET ORAL DAILY
Status: DISCONTINUED | OUTPATIENT
Start: 2024-09-25 | End: 2024-09-26

## 2024-09-24 RX ORDER — AMLODIPINE BESYLATE 10 MG/1
10 TABLET ORAL DAILY
Status: DISCONTINUED | OUTPATIENT
Start: 2024-09-24 | End: 2024-09-27 | Stop reason: HOSPADM

## 2024-09-24 RX ORDER — INSULIN LISPRO 100 [IU]/ML
0-5 INJECTION, SOLUTION INTRAVENOUS; SUBCUTANEOUS
Status: DISCONTINUED | OUTPATIENT
Start: 2024-09-25 | End: 2024-09-25

## 2024-09-24 RX ORDER — CHOLECALCIFEROL (VITAMIN D3) 50 MCG
50 TABLET ORAL DAILY
COMMUNITY

## 2024-09-24 RX ORDER — DORZOLAMIDE HCL 20 MG/ML
1 SOLUTION/ DROPS OPHTHALMIC 3 TIMES DAILY
Status: DISCONTINUED | OUTPATIENT
Start: 2024-09-24 | End: 2024-09-27 | Stop reason: HOSPADM

## 2024-09-24 RX ORDER — IPRATROPIUM BROMIDE AND ALBUTEROL SULFATE 2.5; .5 MG/3ML; MG/3ML
3 SOLUTION RESPIRATORY (INHALATION) EVERY 2 HOUR PRN
Status: DISCONTINUED | OUTPATIENT
Start: 2024-09-24 | End: 2024-09-27 | Stop reason: HOSPADM

## 2024-09-24 RX ORDER — PREDNISOLONE ACETATE 10 MG/ML
1 SUSPENSION/ DROPS OPHTHALMIC 2 TIMES DAILY
COMMUNITY

## 2024-09-24 RX ORDER — BUDESONIDE 0.5 MG/2ML
0.5 INHALANT ORAL
Status: DISCONTINUED | OUTPATIENT
Start: 2024-09-24 | End: 2024-09-27 | Stop reason: HOSPADM

## 2024-09-24 RX ORDER — ACETAMINOPHEN 160 MG/5ML
650 SOLUTION ORAL EVERY 4 HOURS PRN
Status: DISCONTINUED | OUTPATIENT
Start: 2024-09-24 | End: 2024-09-27 | Stop reason: HOSPADM

## 2024-09-24 RX ORDER — ACETAMINOPHEN 650 MG/1
650 SUPPOSITORY RECTAL EVERY 4 HOURS PRN
Status: DISCONTINUED | OUTPATIENT
Start: 2024-09-24 | End: 2024-09-27 | Stop reason: HOSPADM

## 2024-09-24 RX ORDER — LANOLIN ALCOHOL/MO/W.PET/CERES
400 CREAM (GRAM) TOPICAL DAILY
COMMUNITY

## 2024-09-24 RX ORDER — FORMOTEROL FUMARATE DIHYDRATE 20 UG/2ML
20 SOLUTION RESPIRATORY (INHALATION)
Status: DISCONTINUED | OUTPATIENT
Start: 2024-09-24 | End: 2024-09-27 | Stop reason: HOSPADM

## 2024-09-24 RX ORDER — IPRATROPIUM BROMIDE AND ALBUTEROL SULFATE 2.5; .5 MG/3ML; MG/3ML
3 SOLUTION RESPIRATORY (INHALATION)
Status: DISCONTINUED | OUTPATIENT
Start: 2024-09-24 | End: 2024-09-27

## 2024-09-24 RX ORDER — TIMOLOL MALEATE 5 MG/ML
1 SOLUTION/ DROPS OPHTHALMIC DAILY
Status: DISCONTINUED | OUTPATIENT
Start: 2024-09-25 | End: 2024-09-27 | Stop reason: HOSPADM

## 2024-09-24 RX ORDER — ACETAMINOPHEN 325 MG/1
650 TABLET ORAL EVERY 4 HOURS PRN
Status: DISCONTINUED | OUTPATIENT
Start: 2024-09-24 | End: 2024-09-27 | Stop reason: HOSPADM

## 2024-09-24 RX ORDER — IPRATROPIUM BROMIDE AND ALBUTEROL SULFATE 2.5; .5 MG/3ML; MG/3ML
3 SOLUTION RESPIRATORY (INHALATION)
Status: DISCONTINUED | OUTPATIENT
Start: 2024-09-24 | End: 2024-09-24 | Stop reason: SDUPTHER

## 2024-09-24 RX ORDER — FLUTICASONE FUROATE AND VILANTEROL 100; 25 UG/1; UG/1
1 POWDER RESPIRATORY (INHALATION) DAILY
Status: DISCONTINUED | OUTPATIENT
Start: 2024-09-24 | End: 2024-09-24 | Stop reason: CLARIF

## 2024-09-24 RX ORDER — ATORVASTATIN CALCIUM 20 MG/1
20 TABLET, FILM COATED ORAL EVERY EVENING
Status: DISCONTINUED | OUTPATIENT
Start: 2024-09-24 | End: 2024-09-27 | Stop reason: HOSPADM

## 2024-09-24 RX ORDER — NETARSUDIL AND LATANOPROST OPHTHALMIC SOLUTION, 0.02%/0.005% .2; .05 MG/ML; MG/ML
1 SOLUTION/ DROPS OPHTHALMIC; TOPICAL NIGHTLY
COMMUNITY

## 2024-09-24 ASSESSMENT — ENCOUNTER SYMPTOMS
CHEST TIGHTNESS: 1
PALPITATIONS: 1
SHORTNESS OF BREATH: 1
PSYCHIATRIC NEGATIVE: 1
NEUROLOGICAL NEGATIVE: 1
ACTIVITY CHANGE: 1
MUSCULOSKELETAL NEGATIVE: 1
CARDIOVASCULAR NEGATIVE: 1
GASTROINTESTINAL NEGATIVE: 1
SHORTNESS OF BREATH: 1

## 2024-09-24 ASSESSMENT — PAIN - FUNCTIONAL ASSESSMENT: PAIN_FUNCTIONAL_ASSESSMENT: 0-10

## 2024-09-24 ASSESSMENT — PAIN SCALES - GENERAL: PAINLEVEL_OUTOF10: 0 - NO PAIN

## 2024-09-24 NOTE — ED TRIAGE NOTES
Pt arrives via EMS from doctors office. Per EMS doctors office saw her BP was 89/61 and she was 81% on RA. Pt normally wears 4L NC at baseline for COPD. Pt currently on 5L and is at 91% now. Pt on eliquis for Afib

## 2024-09-24 NOTE — ED PROVIDER NOTES
[facility] ED Note    Date of Service: 9/24/2024  Reason for Visit: Shortness of Breath      Patient History     HPI  Mary Ann Forde is a 78 y.o. female with a past medical history of atrial fibrillation (on Eliquis), COPD (intermittently on 3.5 L NC O2) who presents the emergency department for shortness of breath and hypoxia.  Patient states she woke up with shortness of breath.  She states she woke up this morning, felt short of breath, checked her pulse ox and noticed it was low.  She did put her oxygen back on with some improvement although she became low later this morning.  She also reports that her blood pressure was low, with systolic in 80s.  She did try her home Breo inhaler as well as albuterol inhaler without relief of her symptoms.  Because of her low oxygen she presented to ED for further evaluation.  She denies any chest pain, abdominal pain, nausea/vomiting, diarrhea, dysuria and hematuria.  She states that her left leg will intermittently be swollen, this is chronic and she states it may be slightly swollen today.      Past Medical History:   Diagnosis Date    Body mass index (BMI) 37.0-37.9, adult 02/13/2019    BMI 37.0-37.9, adult    Body mass index (BMI) 38.0-38.9, adult 04/05/2022    BMI 38.0-38.9,adult    Drug-induced obesity 04/05/2022    Class 2 drug-induced obesity with body mass index (BMI) of 38.0 to 38.9 in adult, unspecified whether serious comorbidity present    Morbid (severe) obesity due to excess calories (Multi) 01/11/2022    Severe obesity (BMI 35.0-39.9) with comorbidity    Personal history of other specified conditions 06/19/2020    History of bacteremia     Past Surgical History:   Procedure Laterality Date    HYSTERECTOMY  07/29/2013    Hysterectomy         Physical Exam     Vitals:    09/24/24 1600 09/24/24 1630 09/24/24 1730 09/24/24 1830   BP: 129/90 129/89 125/84 120/77   Pulse: 81 85 83 85   Resp:       Temp:       SpO2: (!) 92% 94% (!) 93% (!) 92%   Weight:        Height:         General: Age-appropriate female, nontoxic, sitting comfortably in the gurney no acute distress.  Pulmonary: Non-- labored breathing.  Symmetric chest rise.  Clear breath sounds bilaterally.  Hypoxia requiring 5 L NC O2.  Cardiac:  Regular rate   Abdomen:  Soft. Non-tender. Non-distended  Musculoskeletal: Trace left lower extremity edema.  Skin:  Dry, no rashes  Neuro: Alert and orient x 4.  Moves all 4 extremities spontaneously.    Diagnostic Studies     Labs:  Please see EMR for labs obtained during this patient encounter.    Radiology:  Please see EMR for imaging obtained during this patient encounter.    EKG:  Atrial fibrillation with ventricular rate 80.  Normal axis.  Ventricular conduction in a pattern of left anterior fascicular block with a QRS duration of 82 and a QTc interval 4 of 5.  Normal ST segments, T wave flattening seen in lead III, aVL, and V3-V6.  Relative unchanged prior KG on 4/29/2022.      ED Course and MDM     Mary Ann Forde is a 78 y.o. female with a history and presentation as described above in HPI.      Upon presentation, the patient was afebrile, well-appearing, with vital signs initially notable for hypoxia requiring 6 L NC O2.  Patient presented to the emergency room with shortness of breath and hypoxia.  Differential diagnosis includes COVID, bacterial pneumonia, possible pulmonary embolism, or ACS.  Low suspicion for ACS given absence of ischemic changes on EKG and negative troponin x 2.  Patient's chest x-ray was concerning for infiltrate versus mass, therefore CTPA was ordered that not show any signs of pulmonary embolism but did show groundglass infiltrates.  The remainder of her workup is largely unremarkable.  She was given azithromycin and Rocephin.  Her COVID and flu were negative.  I discussed with the ALEJANDRO hospitalist my concerns for possible COVID given the groundglass appearance as well as the impressive hypoxia, they we will probably start steroids but  I did defer to them given the initial negative COVID test.  Because of her hypoxia she will ultimately be admitted for pneumonia causing hypoxic respiratory failure.        Critical Care Time   CRITICAL CARE TIME    Upon my evaluation, this patient had a high probability of imminent or life-threatening deterioration due to hypoxic respiratory failure, which required my direct attention, intervention, and personal management.    I have personally provided 30 minutes of critical care time exclusive of time spent on separately billable procedures. Time includes review of laboratory data, radiology results, discussion with consultants, and monitoring for potential decompensation. Interventions were performed as documented above.    Impression     Diagnoses as of 09/24/24 1937   Shortness of breath   Hypoxia   Pneumonia of both lungs due to infectious organism, unspecified part of lung      Plan       Admit to medicine, as floor status for further evaluation and management of hypoxic respiratory failure with pneumonia      Janak Ace MD  [facility]         Janak Ace MD  09/24/24 1937

## 2024-09-24 NOTE — H&P
History Of Present Illness  Mary Ann Forde is a 78 y.o. female with a past medical history of atrial fibrillation, hyperlipidemia, COPD on home O2 of 3 to 4 L, diabetes, obesity, hypertension, osteoporosis, glaucoma presenting with shortness of breath.  Patient was seen in office by her primary care provider earlier this afternoon she was noted to be tachypneic with a SpO2 of 81% and a blood pressure of 89/61.  Due to this patient was transported to the emergency department via EMS.  She reports to me that her symptoms started about 630 this morning.  She states that that shortness of breath woke her up this morning.  Patient reports that she wears her oxygen intermittently, however due to her symptoms she had it on all day.  Patient denies fever, chills, chest pain, nausea, vomiting, diarrhea.  Patient reports no recent sick contacts.  CT angio chest for PE pleated in the emergency department and showed negative for pulmonary embolism or thoracic aortic dissection. Hazy groundglass pulmonary infiltrates within the left upper lobe and right upper lobe. Elevation right diaphragm with compressive atelectasis right middle lobe. Cardiomegaly. Age indeterminate compression fracture T7 with 50% loss vertebral body height. No CT findings of pulmonary mass.  WBC 7.1, hemoglobin 12.5, hematocrit 39.3, platelets 259, BMP unremarkable, .  Will check a lactate.     Past Medical History  She has a past medical history of Body mass index (BMI) 37.0-37.9, adult (02/13/2019), Body mass index (BMI) 38.0-38.9, adult (04/05/2022), Drug-induced obesity (04/05/2022), Morbid (severe) obesity due to excess calories (Multi) (01/11/2022), and Personal history of other specified conditions (06/19/2020).    Surgical History  She has a past surgical history that includes Hysterectomy (07/29/2013).     Social History  She reports that she quit smoking about 22 years ago. Her smoking use included cigarettes. She started smoking about 42  years ago. She has a 20 pack-year smoking history. She has never used smokeless tobacco. She reports current alcohol use. She reports that she does not use drugs.    Family History  No family history on file.     Allergies  Patient has no known allergies.    Review of Systems   Constitutional:  Positive for activity change.   HENT: Negative.     Respiratory:  Positive for shortness of breath.    Cardiovascular: Negative.    Gastrointestinal: Negative.    Genitourinary: Negative.    Musculoskeletal: Negative.    Neurological: Negative.    Psychiatric/Behavioral: Negative.          Physical Exam  Constitutional:       Appearance: She is ill-appearing.   Cardiovascular:      Rate and Rhythm: Tachycardia present. Rhythm irregular.   Pulmonary:      Breath sounds: Decreased air movement present.      Comments: Mildly sensory muscle use  Musculoskeletal:         General: Normal range of motion.      Cervical back: Normal range of motion.   Skin:     General: Skin is warm.   Neurological:      Mental Status: She is alert and oriented to person, place, and time.          Last Recorded Vitals  /77   Pulse 85   Temp 36.8 °C (98.3 °F)   Resp 16   Wt 109 kg (240 lb)   SpO2 (!) 92%     Relevant Results        Results for orders placed or performed during the hospital encounter of 09/24/24 (from the past 24 hour(s))   CBC and Auto Differential   Result Value Ref Range    WBC 7.1 4.4 - 11.3 x10*3/uL    nRBC 0.0 0.0 - 0.0 /100 WBCs    RBC 3.95 (L) 4.00 - 5.20 x10*6/uL    Hemoglobin 12.5 12.0 - 16.0 g/dL    Hematocrit 39.3 36.0 - 46.0 %     80 - 100 fL    MCH 31.6 26.0 - 34.0 pg    MCHC 31.8 (L) 32.0 - 36.0 g/dL    RDW 13.2 11.5 - 14.5 %    Platelets 259 150 - 450 x10*3/uL    Neutrophils % 48.9 40.0 - 80.0 %    Immature Granulocytes %, Automated 0.6 0.0 - 0.9 %    Lymphocytes % 26.9 13.0 - 44.0 %    Monocytes % 11.9 2.0 - 10.0 %    Eosinophils % 11.0 0.0 - 6.0 %    Basophils % 0.7 0.0 - 2.0 %    Neutrophils  Absolute 3.45 1.60 - 5.50 x10*3/uL    Immature Granulocytes Absolute, Automated 0.04 0.00 - 0.50 x10*3/uL    Lymphocytes Absolute 1.90 0.80 - 3.00 x10*3/uL    Monocytes Absolute 0.84 (H) 0.05 - 0.80 x10*3/uL    Eosinophils Absolute 0.78 (H) 0.00 - 0.40 x10*3/uL    Basophils Absolute 0.05 0.00 - 0.10 x10*3/uL   Basic metabolic panel   Result Value Ref Range    Glucose 103 (H) 74 - 99 mg/dL    Sodium 140 136 - 145 mmol/L    Potassium 4.3 3.5 - 5.3 mmol/L    Chloride 103 98 - 107 mmol/L    Bicarbonate 29 21 - 32 mmol/L    Anion Gap 12 10 - 20 mmol/L    Urea Nitrogen 10 6 - 23 mg/dL    Creatinine 0.55 0.50 - 1.05 mg/dL    eGFR >90 >60 mL/min/1.73m*2    Calcium 9.2 8.6 - 10.3 mg/dL   Magnesium   Result Value Ref Range    Magnesium 1.60 1.60 - 2.40 mg/dL   B-Type Natriuretic Peptide   Result Value Ref Range     (H) 0 - 99 pg/mL   Troponin I, High Sensitivity, Initial   Result Value Ref Range    Troponin I, High Sensitivity 8 0 - 13 ng/L   Sars-CoV-2 PCR   Result Value Ref Range    Coronavirus 2019, PCR Not Detected Not Detected   Influenza A, and B PCR   Result Value Ref Range    Flu A Result Not Detected Not Detected    Flu B Result Not Detected Not Detected   Troponin, High Sensitivity, 1 Hour   Result Value Ref Range    Troponin I, High Sensitivity 8 0 - 13 ng/L          Assessment/Plan   Assessment & Plan  Shortness of breath  Pneumonia  COPD exacerbation  Acute on chronic hypoxic respiratory failure    Plan:  Continue Rocephin/azithromycin consult pulmonology  Solu-Medrol 125 mg x 1 now  As needed DuoNeb  Continue home respiratory meds  Prednisone 40 mg daily  Check procalcitonin/urine antigens  Check lactate    atrial fibrillation  -Continue metoprolol and Eliquis    Hyperlipidemia  Atorvastatin 20 mg daily    Diabetes  -Currently on no meds will a lispro mild scale given she will be on prednisone     Hypertension  -Continue metoprolol, hold amlodipine for now as BP was soft in office  afternoon    osteoporosis  On Fosamax weekly    glaucoma  -Continue home eyedrops      DVT prophylaxis-patient on Tanya Oseguera, APRN-CNP

## 2024-09-24 NOTE — PROGRESS NOTES
Chief Complaint:   Chief Complaint   Patient presents with    Follow-up      HPI    Mary Ann Forde is a 78 y.o. year old female who presents to the clinic for severe SOB    Past Medical History   Past Medical History:   Diagnosis Date    Body mass index (BMI) 37.0-37.9, adult 02/13/2019    BMI 37.0-37.9, adult    Body mass index (BMI) 38.0-38.9, adult 04/05/2022    BMI 38.0-38.9,adult    Drug-induced obesity 04/05/2022    Class 2 drug-induced obesity with body mass index (BMI) of 38.0 to 38.9 in adult, unspecified whether serious comorbidity present    Morbid (severe) obesity due to excess calories (Multi) 01/11/2022    Severe obesity (BMI 35.0-39.9) with comorbidity    Personal history of other specified conditions 06/19/2020    History of bacteremia      Past Surgical History:   Past Surgical History:   Procedure Laterality Date    HYSTERECTOMY  07/29/2013    Hysterectomy     Family History:   No family history on file.  Social History:   Tobacco Use: Medium Risk (9/4/2024)    Received from [facility]    Patient History     Smoking Tobacco Use: Former     Smokeless Tobacco Use: Never     Passive Exposure: Not on file      Social History     Substance and Sexual Activity   Alcohol Use Yes        Allergies:   No Known Allergies     ROS   Review of Systems   Respiratory:  Positive for chest tightness and shortness of breath.    Cardiovascular:  Positive for palpitations. Negative for leg swelling.        Objective   Vitals:    09/24/24 1418   BP: 89/61   SpO2: (!) 81%    The patient is tachypneic and tachycardic  It's iregularly irregular  Has some elevated JVP  Poor air entry  Trace LE edema  BP low at the first check  Pulse ox extremely low without oxygen  Went up to low 80s with oxygen    BMI Readings from Last 15 Encounters:   09/24/24 35.44 kg/m²   09/24/24 35.87 kg/m²   06/05/24 35.57 kg/m²   03/05/24 35.47 kg/m²   10/03/23 35.44 kg/m²   06/27/23 34.70 kg/m²   04/12/23 36.03 kg/m²   02/24/23 36.48 kg/m²  "  02/08/23 36.03 kg/m²   11/09/22 37.07 kg/m²   04/05/22 38.69 kg/m²   11/30/21 38.69 kg/m²   11/05/21 38.69 kg/m²   09/21/21 38.69 kg/m²   07/13/21 38.54 kg/m²      109 kg (240 lb) (9/24/2024  3:18 PM)      Physical Exam  Physical Exam     Labs:  CBC:  Recent Labs     09/24/24  1614 06/05/24  1435 06/07/23  1454   WBC 7.1 5.9 8.0   HGB 12.5 12.1 11.2*   HCT 39.3 37.5 37.1    246 425    96 99     CMP:  Recent Labs     09/24/24  1614 06/05/24  1435 06/07/23  1454    143 145   K 4.3 4.1 3.8    103 104   CO2 29 30 32   ANIONGAP 12 14 13   BUN 10 10 7   CREATININE 0.55 0.53 0.53   EGFR >90 >90  --    GLUCOSE 103* 107* 110*     Recent Labs     06/05/24  1435 06/07/23  1454 11/09/22  1428   ALBUMIN 4.1 3.7 4.0   ALKPHOS 73 82 73   ALT 10 10 12   AST 11 12 15   BILITOT 1.0 1.0 0.9     Calcium/Phos:   Lab Results   Component Value Date    CALCIUM 9.2 09/24/2024    PHOS 3.9 06/18/2020      COAG:   Recent Labs     04/29/22  0734 04/24/22  1023 06/17/20  1649   INR 1.5*  --  1.5*   DDIMERVTE  --  396  --      CRP: No results found for: \"CRP\"   [unfilled]   ENDO:  Recent Labs     06/05/24  1435 10/03/23  1511 06/07/23  1454 11/09/22  1428   TSH 0.78 1.05 0.71 0.84   HGBA1C 5.8* 5.6 6.3* 5.5      CARDIAC:   Recent Labs     09/24/24  1614 04/29/22  1010 04/29/22  0824 04/29/22  0734 04/24/22  1133 04/24/22  1023 06/17/20  1649   TROPHS 8 11 13 15*   < > 13  --    BNP  --   --   --  118*  --  89 142*    < > = values in this interval not displayed.     Recent Labs     06/05/24  1435 10/03/23  1511 11/09/22  1428 04/05/22  1338 01/25/21  1324   CHOL 135 133 127 139 133   LDLF  --   --  66 73 64   LDLCALC 78 64*  --   --   --    HDL 43.7 57.6 49.7 52.1 55.9   TRIG 66 58 57 68 64     No data recorded    Current Medications:  Current Outpatient Medications   Medication Sig Dispense Refill    albuterol 90 mcg/actuation inhaler INHALE 1 PUFF BY MOUTH EVERY 4 HOURS AS NEEDED 18 g 2    alendronate (Fosamax) " 70 mg tablet Take 1 tablet (70 mg) by mouth every 7 days. Take in the morning with a full glass of water, on an empty stomach, and do not take anything else by mouth or lie down for the next 30 min. 4 tablet 11    amLODIPine (Norvasc) 10 mg tablet Take 1 tablet (10 mg) by mouth once daily. for blood pressure 90 tablet 0    apixaban (Eliquis) 5 mg tablet Take 1 tablet (5 mg) by mouth 2 times a day. 180 tablet 0    atorvastatin (Lipitor) 20 mg tablet Take 1 tablet (20 mg) by mouth once daily in the evening. 90 tablet 0    calcium carbonate (Tums) 200 mg calcium chewable tablet Chew 3 tablets (1,500 mg) once daily. 90 tablet 11    dorzolamide (Trusopt) 2 % ophthalmic solution Administer 1 drop into the left eye 3 times a day. 30 mL 1    fluticasone furoate-vilanteroL (Breo Ellipta) 100-25 mcg/dose inhaler Inhale 1 puff once daily. 60 each 2    ipratropium-albuteroL (Duo-Neb) 0.5-2.5 mg/3 mL nebulizer solution Take 3 mL by nebulization 4 times a day. 180 mL 1    metoprolol tartrate (Lopressor) 25 mg tablet Take 1 tablet (25 mg) by mouth 2 times a day. 180 tablet 0    timolol (Timoptic) 0.5 % ophthalmic solution Administer 1 drop into both eyes once daily. 5 mL 0     No current facility-administered medications for this visit.       Assessment and Plan  Mary Ann was seen today for follow-up.  Diagnoses and all orders for this visit:  Acute dyspnea (Primary)  Hypoxia  COPD exacerbation (Multi)       Presenting with sudden onset of dyspnea and hypoxia, hypotension, tachycardia and labored breathing.  Oxygenation improved slightly to 80s on 4 LNC.  Differential include:  COPD with acute exacerbation with hypoxemic respiratory failure  Possible CHF due to A fib with RVR  A fib with RVR resulting in reduced EF and hypotension  PE, she is on Eliquis, and it's less likely, but still possible  Recommend ER evaluation  911 called  Patient [facility] by [facility].  The daughter is with the patient and will go  there      Immunizations:  Immunization History   Administered Date(s) Administered    Flu vaccine (IIV4), preservative free *Check age/dose* 11/23/2015    Flu vaccine, quadrivalent, high-dose, preservative free, age 65y+ (FLUZONE) 10/03/2023    Flu vaccine, trivalent, preservative free, HIGH-DOSE, age 65y+ (Fluzone) 10/10/2017, 11/09/2018, 01/25/2021, 09/21/2021    Flu vaccine, trivalent, preservative free, age 6 months and greater (Fluarix/Fluzone/Flulaval) 09/07/2011, 09/05/2012, 09/18/2013, 10/07/2014, 11/09/2022    Pfizer Purple Cap SARS-CoV-2 03/14/2021, 04/11/2021, 04/11/2021, 01/11/2022    Pneumococcal conjugate vaccine, 13-valent (PREVNAR 13) 10/10/2017    Pneumococcal polysaccharide vaccine, 23-valent, age 2 years and older (PNEUMOVAX 23) 11/09/2018    Tdap vaccine, age 7 year and older (BOOSTRIX, ADACEL) 10/03/2023

## 2024-09-25 ENCOUNTER — APPOINTMENT (OUTPATIENT)
Dept: CARDIOLOGY | Facility: HOSPITAL | Age: 79
End: 2024-09-25
Payer: MEDICARE

## 2024-09-25 LAB
ALBUMIN SERPL BCP-MCNC: 3.5 G/DL (ref 3.4–5)
ALP SERPL-CCNC: 63 U/L (ref 33–136)
ALT SERPL W P-5'-P-CCNC: 13 U/L (ref 7–45)
ANION GAP SERPL CALC-SCNC: 14 MMOL/L (ref 10–20)
AORTIC VALVE PEAK VELOCITY: 1.44 M/S
AST SERPL W P-5'-P-CCNC: 14 U/L (ref 9–39)
AV PEAK GRADIENT: 8.3 MMHG
AVA (PEAK VEL): 2.69 CM2
BASOPHILS # BLD AUTO: 0.02 X10*3/UL (ref 0–0.1)
BASOPHILS NFR BLD AUTO: 0.4 %
BILIRUB SERPL-MCNC: 0.8 MG/DL (ref 0–1.2)
BUN SERPL-MCNC: 8 MG/DL (ref 6–23)
CALCIUM SERPL-MCNC: 8.6 MG/DL (ref 8.6–10.3)
CHLORIDE SERPL-SCNC: 101 MMOL/L (ref 98–107)
CO2 SERPL-SCNC: 26 MMOL/L (ref 21–32)
CREAT SERPL-MCNC: 0.46 MG/DL (ref 0.5–1.05)
EGFRCR SERPLBLD CKD-EPI 2021: >90 ML/MIN/1.73M*2
EJECTION FRACTION APICAL 4 CHAMBER: 57.6
EJECTION FRACTION: 78 %
EOSINOPHIL # BLD AUTO: 0 X10*3/UL (ref 0–0.4)
EOSINOPHIL NFR BLD AUTO: 0 %
ERYTHROCYTE [DISTWIDTH] IN BLOOD BY AUTOMATED COUNT: 13.2 % (ref 11.5–14.5)
GLUCOSE SERPL-MCNC: 169 MG/DL (ref 74–99)
HCT VFR BLD AUTO: 37.1 % (ref 36–46)
HGB BLD-MCNC: 12 G/DL (ref 12–16)
IMM GRANULOCYTES # BLD AUTO: 0.02 X10*3/UL (ref 0–0.5)
IMM GRANULOCYTES NFR BLD AUTO: 0.4 % (ref 0–0.9)
LACTATE SERPL-SCNC: 1.1 MMOL/L (ref 0.4–2)
LEFT ATRIUM VOLUME AREA LENGTH INDEX BSA: 42.1 ML/M2
LEFT VENTRICLE INTERNAL DIMENSION DIASTOLE: 2.47 CM (ref 3.5–6)
LEFT VENTRICULAR OUTFLOW TRACT DIAMETER: 2.2 CM
LYMPHOCYTES # BLD AUTO: 0.83 X10*3/UL (ref 0.8–3)
LYMPHOCYTES NFR BLD AUTO: 17.8 %
MCH RBC QN AUTO: 32 PG (ref 26–34)
MCHC RBC AUTO-ENTMCNC: 32.3 G/DL (ref 32–36)
MCV RBC AUTO: 99 FL (ref 80–100)
MONOCYTES # BLD AUTO: 0.04 X10*3/UL (ref 0.05–0.8)
MONOCYTES NFR BLD AUTO: 0.9 %
NEUTROPHILS # BLD AUTO: 3.74 X10*3/UL (ref 1.6–5.5)
NEUTROPHILS NFR BLD AUTO: 80.5 %
NRBC BLD-RTO: 0 /100 WBCS (ref 0–0)
PLATELET # BLD AUTO: 273 X10*3/UL (ref 150–450)
POTASSIUM SERPL-SCNC: 4 MMOL/L (ref 3.5–5.3)
PROCALCITONIN SERPL-MCNC: 0.03 NG/ML
PROT SERPL-MCNC: 7.4 G/DL (ref 6.4–8.2)
RBC # BLD AUTO: 3.75 X10*6/UL (ref 4–5.2)
RIGHT VENTRICLE FREE WALL PEAK S': 14.3 CM/S
RIGHT VENTRICLE PEAK SYSTOLIC PRESSURE: 59 MMHG
SODIUM SERPL-SCNC: 137 MMOL/L (ref 136–145)
TRICUSPID ANNULAR PLANE SYSTOLIC EXCURSION: 4.1 CM
WBC # BLD AUTO: 4.7 X10*3/UL (ref 4.4–11.3)

## 2024-09-25 PROCEDURE — 84075 ASSAY ALKALINE PHOSPHATASE: CPT | Performed by: NURSE PRACTITIONER

## 2024-09-25 PROCEDURE — 84145 PROCALCITONIN (PCT): CPT | Mod: AHULAB | Performed by: NURSE PRACTITIONER

## 2024-09-25 PROCEDURE — 85025 COMPLETE CBC W/AUTO DIFF WBC: CPT | Performed by: NURSE PRACTITIONER

## 2024-09-25 PROCEDURE — 36415 COLL VENOUS BLD VENIPUNCTURE: CPT | Performed by: NURSE PRACTITIONER

## 2024-09-25 PROCEDURE — 1200000002 HC GENERAL ROOM WITH TELEMETRY DAILY

## 2024-09-25 PROCEDURE — 2500000004 HC RX 250 GENERAL PHARMACY W/ HCPCS (ALT 636 FOR OP/ED): Performed by: NURSE PRACTITIONER

## 2024-09-25 PROCEDURE — 99233 SBSQ HOSP IP/OBS HIGH 50: CPT | Performed by: INTERNAL MEDICINE

## 2024-09-25 PROCEDURE — 2500000005 HC RX 250 GENERAL PHARMACY W/O HCPCS: Performed by: INTERNAL MEDICINE

## 2024-09-25 PROCEDURE — 83605 ASSAY OF LACTIC ACID: CPT | Performed by: NURSE PRACTITIONER

## 2024-09-25 PROCEDURE — 94640 AIRWAY INHALATION TREATMENT: CPT

## 2024-09-25 PROCEDURE — 2500000002 HC RX 250 W HCPCS SELF ADMINISTERED DRUGS (ALT 637 FOR MEDICARE OP, ALT 636 FOR OP/ED): Performed by: NURSE PRACTITIONER

## 2024-09-25 PROCEDURE — 2500000005 HC RX 250 GENERAL PHARMACY W/O HCPCS: Performed by: NURSE PRACTITIONER

## 2024-09-25 PROCEDURE — 93306 TTE W/DOPPLER COMPLETE: CPT | Performed by: INTERNAL MEDICINE

## 2024-09-25 PROCEDURE — 93306 TTE W/DOPPLER COMPLETE: CPT

## 2024-09-25 PROCEDURE — 2500000001 HC RX 250 WO HCPCS SELF ADMINISTERED DRUGS (ALT 637 FOR MEDICARE OP): Performed by: NURSE PRACTITIONER

## 2024-09-25 RX ORDER — LANOLIN ALCOHOL/MO/W.PET/CERES
400 CREAM (GRAM) TOPICAL DAILY
Status: DISCONTINUED | OUTPATIENT
Start: 2024-09-25 | End: 2024-09-27 | Stop reason: HOSPADM

## 2024-09-25 RX ORDER — CHOLECALCIFEROL (VITAMIN D3) 25 MCG
2000 TABLET ORAL DAILY
Status: DISCONTINUED | OUTPATIENT
Start: 2024-09-25 | End: 2024-09-27 | Stop reason: HOSPADM

## 2024-09-25 RX ORDER — PREDNISOLONE ACETATE 10 MG/ML
1 SUSPENSION/ DROPS OPHTHALMIC 2 TIMES DAILY
Status: DISCONTINUED | OUTPATIENT
Start: 2024-09-25 | End: 2024-09-27 | Stop reason: HOSPADM

## 2024-09-25 SDOH — SOCIAL STABILITY: SOCIAL INSECURITY: HAVE YOU HAD THOUGHTS OF HARMING ANYONE ELSE?: NO

## 2024-09-25 SDOH — SOCIAL STABILITY: SOCIAL INSECURITY: DOES ANYONE TRY TO KEEP YOU FROM HAVING/CONTACTING OTHER FRIENDS OR DOING THINGS OUTSIDE YOUR HOME?: NO

## 2024-09-25 SDOH — ECONOMIC STABILITY: INCOME INSECURITY: IN THE LAST 12 MONTHS, WAS THERE A TIME WHEN YOU WERE NOT ABLE TO PAY THE MORTGAGE OR RENT ON TIME?: NO

## 2024-09-25 SDOH — ECONOMIC STABILITY: HOUSING INSECURITY: IN THE PAST 12 MONTHS, HOW MANY TIMES HAVE YOU MOVED WHERE YOU WERE LIVING?: 1

## 2024-09-25 SDOH — HEALTH STABILITY: MENTAL HEALTH: HOW OFTEN DO YOU HAVE A DRINK CONTAINING ALCOHOL?: NEVER

## 2024-09-25 SDOH — SOCIAL STABILITY: SOCIAL INSECURITY: DO YOU FEEL ANYONE HAS EXPLOITED OR TAKEN ADVANTAGE OF YOU FINANCIALLY OR OF YOUR PERSONAL PROPERTY?: NO

## 2024-09-25 SDOH — ECONOMIC STABILITY: HOUSING INSECURITY: AT ANY TIME IN THE PAST 12 MONTHS, WERE YOU HOMELESS OR LIVING IN A SHELTER (INCLUDING NOW)?: NO

## 2024-09-25 SDOH — ECONOMIC STABILITY: TRANSPORTATION INSECURITY
IN THE PAST 12 MONTHS, HAS THE LACK OF TRANSPORTATION KEPT YOU FROM MEDICAL APPOINTMENTS OR FROM GETTING MEDICATIONS?: NO

## 2024-09-25 SDOH — HEALTH STABILITY: MENTAL HEALTH: HOW MANY STANDARD DRINKS CONTAINING ALCOHOL DO YOU HAVE ON A TYPICAL DAY?: 1 OR 2

## 2024-09-25 SDOH — ECONOMIC STABILITY: TRANSPORTATION INSECURITY
IN THE PAST 12 MONTHS, HAS LACK OF TRANSPORTATION KEPT YOU FROM MEETINGS, WORK, OR FROM GETTING THINGS NEEDED FOR DAILY LIVING?: NO

## 2024-09-25 SDOH — HEALTH STABILITY: MENTAL HEALTH: HOW OFTEN DO YOU HAVE A DRINK CONTAINING ALCOHOL?: 4 OR MORE TIMES A WEEK

## 2024-09-25 SDOH — SOCIAL STABILITY: SOCIAL INSECURITY: HAVE YOU HAD ANY THOUGHTS OF HARMING ANYONE ELSE?: NO

## 2024-09-25 SDOH — SOCIAL STABILITY: SOCIAL INSECURITY: DO YOU FEEL UNSAFE GOING BACK TO THE PLACE WHERE YOU ARE LIVING?: NO

## 2024-09-25 SDOH — SOCIAL STABILITY: SOCIAL INSECURITY: ARE THERE ANY APPARENT SIGNS OF INJURIES/BEHAVIORS THAT COULD BE RELATED TO ABUSE/NEGLECT?: NO

## 2024-09-25 SDOH — HEALTH STABILITY: MENTAL HEALTH: HOW MANY STANDARD DRINKS CONTAINING ALCOHOL DO YOU HAVE ON A TYPICAL DAY?: PATIENT DOES NOT DRINK

## 2024-09-25 SDOH — HEALTH STABILITY: MENTAL HEALTH: HOW OFTEN DO YOU HAVE 6 OR MORE DRINKS ON ONE OCCASION?: NEVER

## 2024-09-25 SDOH — SOCIAL STABILITY: SOCIAL INSECURITY: WERE YOU ABLE TO COMPLETE ALL THE BEHAVIORAL HEALTH SCREENINGS?: YES

## 2024-09-25 SDOH — SOCIAL STABILITY: SOCIAL INSECURITY: ARE YOU OR HAVE YOU BEEN THREATENED OR ABUSED PHYSICALLY, EMOTIONALLY, OR SEXUALLY BY ANYONE?: NO

## 2024-09-25 SDOH — ECONOMIC STABILITY: INCOME INSECURITY: HOW HARD IS IT FOR YOU TO PAY FOR THE VERY BASICS LIKE FOOD, HOUSING, MEDICAL CARE, AND HEATING?: NOT VERY HARD

## 2024-09-25 SDOH — SOCIAL STABILITY: SOCIAL INSECURITY: HAS ANYONE EVER THREATENED TO HURT YOUR FAMILY OR YOUR PETS?: NO

## 2024-09-25 SDOH — SOCIAL STABILITY: SOCIAL INSECURITY: ABUSE: ADULT

## 2024-09-25 SDOH — ECONOMIC STABILITY: INCOME INSECURITY: HOW HARD IS IT FOR YOU TO PAY FOR THE VERY BASICS LIKE FOOD, HOUSING, MEDICAL CARE, AND HEATING?: NOT HARD AT ALL

## 2024-09-25 ASSESSMENT — PAIN SCALES - GENERAL
PAINLEVEL_OUTOF10: 0 - NO PAIN

## 2024-09-25 ASSESSMENT — COGNITIVE AND FUNCTIONAL STATUS - GENERAL
DAILY ACTIVITIY SCORE: 24
TURNING FROM BACK TO SIDE WHILE IN FLAT BAD: A LITTLE
CLIMB 3 TO 5 STEPS WITH RAILING: A LITTLE
MOBILITY SCORE: 23
STANDING UP FROM CHAIR USING ARMS: A LITTLE
CLIMB 3 TO 5 STEPS WITH RAILING: A LITTLE
WALKING IN HOSPITAL ROOM: A LITTLE
DAILY ACTIVITIY SCORE: 24
WALKING IN HOSPITAL ROOM: A LITTLE
STANDING UP FROM CHAIR USING ARMS: A LITTLE
PATIENT BASELINE BEDBOUND: NO
MOBILITY SCORE: 21
DAILY ACTIVITIY SCORE: 24
CLIMB 3 TO 5 STEPS WITH RAILING: A LITTLE

## 2024-09-25 ASSESSMENT — ACTIVITIES OF DAILY LIVING (ADL)
BATHING: INDEPENDENT
HEARING - RIGHT EAR: FUNCTIONAL
TOILETING: INDEPENDENT
JUDGMENT_ADEQUATE_SAFELY_COMPLETE_DAILY_ACTIVITIES: YES
ADEQUATE_TO_COMPLETE_ADL: YES
WALKS IN HOME: INDEPENDENT
ASSISTIVE_DEVICE: WALKER
PATIENT'S MEMORY ADEQUATE TO SAFELY COMPLETE DAILY ACTIVITIES?: YES
HEARING - LEFT EAR: FUNCTIONAL
GROOMING: INDEPENDENT
DRESSING YOURSELF: INDEPENDENT
FEEDING YOURSELF: INDEPENDENT

## 2024-09-25 ASSESSMENT — LIFESTYLE VARIABLES
SKIP TO QUESTIONS 9-10: 1
AUDIT-C TOTAL SCORE: 4
HOW MANY STANDARD DRINKS CONTAINING ALCOHOL DO YOU HAVE ON A TYPICAL DAY: 1 OR 2
SKIP TO QUESTIONS 9-10: 1
HOW OFTEN DO YOU HAVE 6 OR MORE DRINKS ON ONE OCCASION: NEVER
AUDIT-C TOTAL SCORE: 4
HOW OFTEN DO YOU HAVE A DRINK CONTAINING ALCOHOL: 4 OR MORE TIMES A WEEK
AUDIT-C TOTAL SCORE: 4

## 2024-09-25 ASSESSMENT — PATIENT HEALTH QUESTIONNAIRE - PHQ9
2. FEELING DOWN, DEPRESSED OR HOPELESS: NOT AT ALL
1. LITTLE INTEREST OR PLEASURE IN DOING THINGS: NOT AT ALL
SUM OF ALL RESPONSES TO PHQ9 QUESTIONS 1 & 2: 0

## 2024-09-25 NOTE — PROGRESS NOTES
Mary Ann Forde is a 78 y.o. female on day 1 of admission presenting with Shortness of breath.      Subjective   Pt seen and examined.        Objective     Last Recorded Vitals  /80 (BP Location: Right arm, Patient Position: Lying)   Pulse 101   Temp 36.7 °C (98 °F) (Oral)   Resp 16   Wt 109 kg (240 lb)   SpO2 93%   Intake/Output last 3 Shifts:    Intake/Output Summary (Last 24 hours) at 9/25/2024 1112  Last data filed at 9/24/2024 2157  Gross per 24 hour   Intake 50 ml   Output --   Net 50 ml       Admission Weight  Weight: 109 kg (240 lb) (09/24/24 1518)    Daily Weight  09/25/24 : 109 kg (240 lb)      Physical Exam  Constitutional: No acute distress, awake, alert  Head/Neck: Neck supple  Respiratory/Thorax: Diminished breath sounds  Cardiovascular: Regular, rate and rhythm,  2+ equal pulses of the extremities, normal S 1and S 2  Gastrointestinal: Nondistended, soft, non-tender, no rebound tenderness or guarding  Extremities: No edema. No calf tenderness.  Neurological: Awake and alert. No focal neurological deficits  Psychological: Appropriate mood and behavior    Relevant Results  Results for orders placed or performed during the hospital encounter of 09/24/24 (from the past 24 hour(s))   ECG 12 lead   Result Value Ref Range    Ventricular Rate 80 BPM    QRS Duration 82 ms    QT Interval 352 ms    QTC Calculation(Bazett) 405 ms    R Axis -50 degrees    T Axis 13 degrees    QRS Count 13 beats    Q Onset 214 ms    T Offset 390 ms    QTC Fredericia 387 ms   CBC and Auto Differential   Result Value Ref Range    WBC 7.1 4.4 - 11.3 x10*3/uL    nRBC 0.0 0.0 - 0.0 /100 WBCs    RBC 3.95 (L) 4.00 - 5.20 x10*6/uL    Hemoglobin 12.5 12.0 - 16.0 g/dL    Hematocrit 39.3 36.0 - 46.0 %     80 - 100 fL    MCH 31.6 26.0 - 34.0 pg    MCHC 31.8 (L) 32.0 - 36.0 g/dL    RDW 13.2 11.5 - 14.5 %    Platelets 259 150 - 450 x10*3/uL    Neutrophils % 48.9 40.0 - 80.0 %    Immature Granulocytes %, Automated 0.6 0.0 - 0.9 %     Lymphocytes % 26.9 13.0 - 44.0 %    Monocytes % 11.9 2.0 - 10.0 %    Eosinophils % 11.0 0.0 - 6.0 %    Basophils % 0.7 0.0 - 2.0 %    Neutrophils Absolute 3.45 1.60 - 5.50 x10*3/uL    Immature Granulocytes Absolute, Automated 0.04 0.00 - 0.50 x10*3/uL    Lymphocytes Absolute 1.90 0.80 - 3.00 x10*3/uL    Monocytes Absolute 0.84 (H) 0.05 - 0.80 x10*3/uL    Eosinophils Absolute 0.78 (H) 0.00 - 0.40 x10*3/uL    Basophils Absolute 0.05 0.00 - 0.10 x10*3/uL   Basic metabolic panel   Result Value Ref Range    Glucose 103 (H) 74 - 99 mg/dL    Sodium 140 136 - 145 mmol/L    Potassium 4.3 3.5 - 5.3 mmol/L    Chloride 103 98 - 107 mmol/L    Bicarbonate 29 21 - 32 mmol/L    Anion Gap 12 10 - 20 mmol/L    Urea Nitrogen 10 6 - 23 mg/dL    Creatinine 0.55 0.50 - 1.05 mg/dL    eGFR >90 >60 mL/min/1.73m*2    Calcium 9.2 8.6 - 10.3 mg/dL   Magnesium   Result Value Ref Range    Magnesium 1.60 1.60 - 2.40 mg/dL   B-Type Natriuretic Peptide   Result Value Ref Range     (H) 0 - 99 pg/mL   Troponin I, High Sensitivity, Initial   Result Value Ref Range    Troponin I, High Sensitivity 8 0 - 13 ng/L   Sars-CoV-2 PCR   Result Value Ref Range    Coronavirus 2019, PCR Not Detected Not Detected   Influenza A, and B PCR   Result Value Ref Range    Flu A Result Not Detected Not Detected    Flu B Result Not Detected Not Detected   Troponin, High Sensitivity, 1 Hour   Result Value Ref Range    Troponin I, High Sensitivity 8 0 - 13 ng/L   Lactate   Result Value Ref Range    Lactate 1.1 0.4 - 2.0 mmol/L   CBC and Auto Differential   Result Value Ref Range    WBC 4.7 4.4 - 11.3 x10*3/uL    nRBC 0.0 0.0 - 0.0 /100 WBCs    RBC 3.75 (L) 4.00 - 5.20 x10*6/uL    Hemoglobin 12.0 12.0 - 16.0 g/dL    Hematocrit 37.1 36.0 - 46.0 %    MCV 99 80 - 100 fL    MCH 32.0 26.0 - 34.0 pg    MCHC 32.3 32.0 - 36.0 g/dL    RDW 13.2 11.5 - 14.5 %    Platelets 273 150 - 450 x10*3/uL    Neutrophils % 80.5 40.0 - 80.0 %    Immature Granulocytes %, Automated 0.4 0.0 -  0.9 %    Lymphocytes % 17.8 13.0 - 44.0 %    Monocytes % 0.9 2.0 - 10.0 %    Eosinophils % 0.0 0.0 - 6.0 %    Basophils % 0.4 0.0 - 2.0 %    Neutrophils Absolute 3.74 1.60 - 5.50 x10*3/uL    Immature Granulocytes Absolute, Automated 0.02 0.00 - 0.50 x10*3/uL    Lymphocytes Absolute 0.83 0.80 - 3.00 x10*3/uL    Monocytes Absolute 0.04 (L) 0.05 - 0.80 x10*3/uL    Eosinophils Absolute 0.00 0.00 - 0.40 x10*3/uL    Basophils Absolute 0.02 0.00 - 0.10 x10*3/uL   Comprehensive Metabolic Panel   Result Value Ref Range    Glucose 169 (H) 74 - 99 mg/dL    Sodium 137 136 - 145 mmol/L    Potassium 4.0 3.5 - 5.3 mmol/L    Chloride 101 98 - 107 mmol/L    Bicarbonate 26 21 - 32 mmol/L    Anion Gap 14 10 - 20 mmol/L    Urea Nitrogen 8 6 - 23 mg/dL    Creatinine 0.46 (L) 0.50 - 1.05 mg/dL    eGFR >90 >60 mL/min/1.73m*2    Calcium 8.6 8.6 - 10.3 mg/dL    Albumin 3.5 3.4 - 5.0 g/dL    Alkaline Phosphatase 63 33 - 136 U/L    Total Protein 7.4 6.4 - 8.2 g/dL    AST 14 9 - 39 U/L    Bilirubin, Total 0.8 0.0 - 1.2 mg/dL    ALT 13 7 - 45 U/L        CT angio chest for pulmonary embolism   Final Result   Negative for pulmonary embolism or thoracic aortic dissection.   Hazy groundglass pulmonary infiltrates within the left upper lobe and   right upper lobe.   Elevation right diaphragm with compressive atelectasis right middle   lobe.   Cardiomegaly.   Age indeterminate compression fracture T7 with 50% loss vertebral body   height.   No CT findings of pulmonary mass.   Signed by Basil Plata MD      XR chest 2 views   Final Result   New right middle lobe consolidation.  Follow-up CT scan is recommended   to differentiate underlying neoplasm versus pneumonic process.   Signed by Bridger Golden M.D.      Transthoracic Echo (TTE) Complete    (Results Pending)       Scheduled medications  [Held by provider] amLODIPine, 10 mg, oral, Daily  apixaban, 5 mg, oral, BID  atorvastatin, 20 mg, oral, q PM  azithromycin, 500 mg, intravenous,  q24h  budesonide, 0.5 mg, nebulization, BID   And  formoterol, 20 mcg, nebulization, BID  cefTRIAXone, 2 g, intravenous, q24h  cholecalciferol, 2,000 Units, oral, Daily  dorzolamide, 1 drop, Left Eye, TID  ipratropium-albuteroL, 3 mL, nebulization, 4x daily  magnesium oxide, 400 mg, oral, Daily  metoprolol tartrate, 25 mg, oral, BID  netarsudiL-latanoprost, 1 drop, Right Eye, Nightly  oxygen, , inhalation, Continuous - Inhalation  prednisoLONE acetate, 1 drop, Left Eye, BID  predniSONE, 40 mg, oral, Daily  timolol, 1 drop, Both Eyes, Daily      Continuous medications     PRN medications  PRN medications: acetaminophen **OR** acetaminophen **OR** acetaminophen, ipratropium-albuteroL         Assessment/Plan                  Principal Problem:    Shortness of breath    Pneumonia  COPD exacerbation  Acute on chronic hypoxic respiratory failure     Plan:  Continue Rocephin/azithromycin consult pulmonology  As needed DuoNeb  Continue home respiratory meds  Prednisone 40 mg daily  Check procalcitonin/urine antigens  Check ECHO to rule out CHF     atrial fibrillation  -Continue metoprolol and Eliquis     Hyperlipidemia  Atorvastatin 20 mg daily     Diabetes  -Currently on no meds will a lispro mild scale given she will be on prednisone      Hypertension  -Continue metoprolol, hold amlodipine for now as BP was soft in office afternoon     osteoporosis  On Fosamax weekly     glaucoma  -Continue home eyedrops        DVT prophylaxis-patient on Eliquis                 Melissa Albrecht MD

## 2024-09-25 NOTE — PROGRESS NOTES
Pharmacy Medication History Review    Mary Ann Forde is a 78 y.o. female admitted for Shortness of breath. Pharmacy reviewed the patient's [id number] medications and allergies for accuracy.    The list below reflectives the updated PTA list. Please review each medication in order reconciliation for additional clarification and justification.       The list below reflectives the updated allergy list. Please review each documented allergy for additional clarification and justification.  Allergies  Reviewed by Arin Sanders RN on 9/24/2024   No Known Allergies         Below are additional concerns with the patient's PTA list.  Prior to Admission Medications   Prescriptions Last Dose Informant   albuterol 90 mcg/actuation inhaler     Sig: INHALE 1 PUFF BY MOUTH EVERY 4 HOURS AS NEEDED   Patient taking differently: Inhale 1 puff every 4 hours if needed for wheezing or shortness of breath. INHALE 1 PUFF BY MOUTH EVERY 4 HOURS AS NEEDED   alendronate (Fosamax) 70 mg tablet 9/19/2024    Sig: Take 1 tablet (70 mg) by mouth every 7 days. Take in the morning with a full glass of water, on an empty stomach, and do not take anything else by mouth or lie down for the next 30 min.   Patient taking differently: Take 1 tablet (70 mg) by mouth every 7 days. Take in the morning with a full glass of water, on an empty stomach, and do not take anything else by mouth or lie down for the next 30 min. Thurs   amLODIPine (Norvasc) 10 mg tablet 9/24/2024    Sig: Take 1 tablet (10 mg) by mouth once daily. for blood pressure   Patient taking differently: Take 0.5 tablets (5 mg) by mouth once daily. for blood pressure   apixaban (Eliquis) 5 mg tablet 9/24/2024    Sig: Take 1 tablet (5 mg) by mouth 2 times a day.   atorvastatin (Lipitor) 20 mg tablet 9/23/2024    Sig: Take 1 tablet (20 mg) by mouth once daily in the evening.   calcium carbonate (Tums) 200 mg calcium chewable tablet 9/24/2024    Sig: Chew 3 tablets (1,500 mg) once  daily.   cholecalciferol (Vitamin D3) 50 MCG (2000 UT) tablet 9/24/2024    Sig: Take 1 tablet (50 mcg) by mouth once daily.              fluticasone furoate-vilanteroL (Breo Ellipta) 100-25 mcg/dose inhaler 9/24/2024    Sig: Inhale 1 puff once daily.   ipratropium-albuteroL (Duo-Neb) 0.5-2.5 mg/3 mL nebulizer solution     Sig: Take 3 mL by nebulization 4 times a day.   Patient taking differently: Take 3 mL by nebulization every 6 hours if needed for shortness of breath.   magnesium oxide (Mag-Ox) 400 mg (241.3 mg magnesium) tablet 9/24/2024    Sig: Take 1 tablet (400 mg) by mouth once daily.   metoprolol tartrate (Lopressor) 25 mg tablet 9/24/2024    Sig: Take 1 tablet (25 mg) by mouth 2 times a day.   netarsudiL-latanoprost (Rocklatan) 0.02-0.005 % drops 9/23/2024    Sig: Administer 1 drop into the right eye once daily at bedtime.   prednisoLONE acetate (Pred-Forte) 1 % ophthalmic suspension 9/24/2024    Sig: Administer 1 drop into the left eye 2 times a day.   timolol (Timoptic) 0.5 % ophthalmic solution 9/24/2024    Sig: Administer 1 drop into both eyes once daily.   Patient taking differently: Administer 1 drop into the left eye once daily.      Facility-Administered Medications: None      The following updates were made to the Prior to Admission medication list:     Source of Information:     Medications ADDED:   Rocklatan  Prednisolone  Vitamin d  magnesium  Medications CHANGED:  Amlodipine is now 5mg daily not 10mg  Timolol is left eye only  Medications REMOVED:   N/a  Medications NOT TAKING:   dorzolomide    Allergy reviewed : Yes    Comments: per patient and daughter bedside    Valeria Wise

## 2024-09-25 NOTE — CARE PLAN
Problem: Pain - Adult  Goal: Verbalizes/displays adequate comfort level or baseline comfort level  Outcome: Progressing     Problem: Safety - Adult  Goal: Free from fall injury  Outcome: Progressing     Problem: Discharge Planning  Goal: Discharge to home or other facility with appropriate resources  Outcome: Progressing     Problem: Chronic Conditions and Co-morbidities  Goal: Patient's chronic conditions and co-morbidity symptoms are monitored and maintained or improved  Outcome: Progressing     Problem: Skin  Goal: Decreased wound size/increased tissue granulation at next dressing change  Outcome: Progressing  Goal: Participates in plan/prevention/treatment measures  Outcome: Progressing  Goal: Prevent/manage excess moisture  Outcome: Progressing  Goal: Prevent/minimize sheer/friction injuries  Outcome: Progressing  Goal: Promote/optimize nutrition  Outcome: Progressing  Goal: Promote skin healing  Outcome: Progressing     Problem: Fall/Injury  Goal: Not fall by end of shift  Outcome: Progressing  Goal: Be free from injury by end of the shift  Outcome: Progressing  Goal: Verbalize understanding of personal risk factors for fall in the hospital  Outcome: Progressing  Goal: Verbalize understanding of risk factor reduction measures to prevent injury from fall in the home  Outcome: Progressing  Goal: Use assistive devices by end of the shift  Outcome: Progressing  Goal: Pace activities to prevent fatigue by end of the shift  Outcome: Progressing   The patient's goals for the shift include      The clinical goals for the shift include Patient will maintain safety by utilizing the call light for needs until the end of the shift

## 2024-09-25 NOTE — PROGRESS NOTES
09/25/24 0751   Discharge Planning   Living Arrangements Alone   Support Systems Family members;Friends/neighbors   Type of Residence Private residence   Do you have animals or pets at home? No   Who is requesting discharge planning? Patient   Home or Post Acute Services None   Expected Discharge Disposition Home   Does the patient need discharge transport arranged? Yes   RoundTrip coordination needed? Yes   Has discharge transport been arranged? No   Financial Resource Strain   How hard is it for you to pay for the very basics like food, housing, medical care, and heating? Not very   Housing Stability   In the last 12 months, was there a time when you were not able to pay the mortgage or rent on time? N   At any time in the past 12 months, were you homeless or living in a shelter (including now)? N   Transportation Needs   In the past 12 months, has lack of transportation kept you from medical appointments or from getting medications? no   In the past 12 months, has lack of transportation kept you from meetings, work, or from getting things needed for daily living? No   Patient Choice   Provider Choice list and CMS website (https://medicare.gov/care-compare#search) for post-acute Quality and Resource Measure Data were provided and reviewed with: Patient     I met with this patient at her bedside she is currently sleeping , per notes patient lives home alone but has support from family and friends, patient was adm for complaints of SOB. Patient has orders for IV zithro and Rocephin and a consult to Pulmonology placed, last [facility] score 23, I am anticipating the patient being discharged home with no needs I will continue to monitor for discharge planning.

## 2024-09-25 NOTE — CONSULTS
"Consult Note    Patient is a 78 y.o. female with a past medical history of COPD, bronchitis, chronic hypoxia-on 3.5 L nasal cannula oxygen at home, atrial fibrillation-on Eliquis, bacteremia, hysterectomy, diabetes, hypertension, osteoporosis, glaucoma, possible pulmonary hypertension, DJD and possible obstructive sleep apnea, who was admitted to [facility] on September 24, 2024 from her primary care physician's office with shortness of breath, hypoxia and hypotension.  In emergency room she was noted to have a temperature of 36.8, pulse 81, respiratory rate 16, blood pressure 120/77 and oxygen saturation of 92% on an unknown FiO2.  She was found to have a normal white blood count with the exception of an elevated eosinophil count of 11%, normal BNP and troponin, elevated BNP of 184, negative COVID-19 and influenza A/B PCR and an admission chest x-ray showing a possible right middle lobe/right lower lobe infiltrate.  A CT angiogram of the chest showed no pulmonary embolism or adenopathy, but did show cardiomegaly, right middle lobe atelectasis, groundglass changes in the left upper lobe and a T7 spinal compression fracture.  The patient was treated with DuoNeb, Pulmicort, formoterol, Zithromax, ceftriaxone, Solu-Medrol, prednisone and Timoptic eyedrops.  Presently she feels \"good\".  She states that she has been \"stressed and depressed\" due to the death of her son this past year.  She denies fevers, chills, sweats, chest or leg pain or weight loss, but does admit to mild shortness of breath at rest, dyspnea on exertion, a nonproductive cough, occasional wheezing and postnasal drip.  She uses albuterol, Breo and oxygen 3.5 L nasal cannula at home but does denies a history of sleep apnea and does not use CPAP.    Visit Vitals  /80 (BP Location: Right arm, Patient Position: Lying)   Pulse 101   Temp 36.7 °C (98 °F) (Oral)   Resp 16      Physical Exam:  Gen:  Awake, alert and in NAD; on 4 L nasal cannula " oxygen.  HEENT:  Normal conjunctivae and pharynx; no sinus tenderness.  Neck:  No thyroid enlargement or adenopathy.  Pulm:  Clear, without wheezes, rales, rhonchi, dullness or E to A changes.  Heart:  RRR without S3, S4 or murmurs.  Abd:  Soft, non-tender and with positive bowel sounds.  Extrem:  No clubbing or edema.  Skin:  No rashes.    Scheduled medications  [Held by provider] amLODIPine, 10 mg, oral, Daily  apixaban, 5 mg, oral, BID  atorvastatin, 20 mg, oral, q PM  azithromycin, 500 mg, intravenous, q24h  budesonide, 0.5 mg, nebulization, BID   And  formoterol, 20 mcg, nebulization, BID  cefTRIAXone, 2 g, intravenous, q24h  cholecalciferol, 2,000 Units, oral, Daily  dorzolamide, 1 drop, Left Eye, TID  ipratropium-albuteroL, 3 mL, nebulization, 4x daily  magnesium oxide, 400 mg, oral, Daily  metoprolol tartrate, 25 mg, oral, BID  netarsudiL-latanoprost, 1 drop, Right Eye, Nightly  oxygen, , inhalation, Continuous - Inhalation  prednisoLONE acetate, 1 drop, Left Eye, BID  predniSONE, 40 mg, oral, Daily  timolol, 1 drop, Both Eyes, Daily      Continuous medications     PRN medications  PRN medications: acetaminophen **OR** acetaminophen **OR** acetaminophen, ipratropium-albuteroL     Results for orders placed or performed during the hospital encounter of 09/24/24 (from the past 96 hour(s))   ECG 12 lead   Result Value Ref Range    Ventricular Rate 80 BPM    QRS Duration 82 ms    QT Interval 352 ms    QTC Calculation(Bazett) 405 ms    R Axis -50 degrees    T Axis 13 degrees    QRS Count 13 beats    Q Onset 214 ms    T Offset 390 ms    QTC Fredericia 387 ms   CBC and Auto Differential   Result Value Ref Range    WBC 7.1 4.4 - 11.3 x10*3/uL    nRBC 0.0 0.0 - 0.0 /100 WBCs    RBC 3.95 (L) 4.00 - 5.20 x10*6/uL    Hemoglobin 12.5 12.0 - 16.0 g/dL    Hematocrit 39.3 36.0 - 46.0 %     80 - 100 fL    MCH 31.6 26.0 - 34.0 pg    MCHC 31.8 (L) 32.0 - 36.0 g/dL    RDW 13.2 11.5 - 14.5 %    Platelets 259 150 - 450  x10*3/uL    Neutrophils % 48.9 40.0 - 80.0 %    Immature Granulocytes %, Automated 0.6 0.0 - 0.9 %    Lymphocytes % 26.9 13.0 - 44.0 %    Monocytes % 11.9 2.0 - 10.0 %    Eosinophils % 11.0 0.0 - 6.0 %    Basophils % 0.7 0.0 - 2.0 %    Neutrophils Absolute 3.45 1.60 - 5.50 x10*3/uL    Immature Granulocytes Absolute, Automated 0.04 0.00 - 0.50 x10*3/uL    Lymphocytes Absolute 1.90 0.80 - 3.00 x10*3/uL    Monocytes Absolute 0.84 (H) 0.05 - 0.80 x10*3/uL    Eosinophils Absolute 0.78 (H) 0.00 - 0.40 x10*3/uL    Basophils Absolute 0.05 0.00 - 0.10 x10*3/uL   Basic metabolic panel   Result Value Ref Range    Glucose 103 (H) 74 - 99 mg/dL    Sodium 140 136 - 145 mmol/L    Potassium 4.3 3.5 - 5.3 mmol/L    Chloride 103 98 - 107 mmol/L    Bicarbonate 29 21 - 32 mmol/L    Anion Gap 12 10 - 20 mmol/L    Urea Nitrogen 10 6 - 23 mg/dL    Creatinine 0.55 0.50 - 1.05 mg/dL    eGFR >90 >60 mL/min/1.73m*2    Calcium 9.2 8.6 - 10.3 mg/dL   Magnesium   Result Value Ref Range    Magnesium 1.60 1.60 - 2.40 mg/dL   B-Type Natriuretic Peptide   Result Value Ref Range     (H) 0 - 99 pg/mL   Troponin I, High Sensitivity, Initial   Result Value Ref Range    Troponin I, High Sensitivity 8 0 - 13 ng/L   Sars-CoV-2 PCR   Result Value Ref Range    Coronavirus 2019, PCR Not Detected Not Detected   Influenza A, and B PCR   Result Value Ref Range    Flu A Result Not Detected Not Detected    Flu B Result Not Detected Not Detected   Troponin, High Sensitivity, 1 Hour   Result Value Ref Range    Troponin I, High Sensitivity 8 0 - 13 ng/L   Lactate   Result Value Ref Range    Lactate 1.1 0.4 - 2.0 mmol/L   CBC and Auto Differential   Result Value Ref Range    WBC 4.7 4.4 - 11.3 x10*3/uL    nRBC 0.0 0.0 - 0.0 /100 WBCs    RBC 3.75 (L) 4.00 - 5.20 x10*6/uL    Hemoglobin 12.0 12.0 - 16.0 g/dL    Hematocrit 37.1 36.0 - 46.0 %    MCV 99 80 - 100 fL    MCH 32.0 26.0 - 34.0 pg    MCHC 32.3 32.0 - 36.0 g/dL    RDW 13.2 11.5 - 14.5 %    Platelets 273 150  - 450 x10*3/uL    Neutrophils % 80.5 40.0 - 80.0 %    Immature Granulocytes %, Automated 0.4 0.0 - 0.9 %    Lymphocytes % 17.8 13.0 - 44.0 %    Monocytes % 0.9 2.0 - 10.0 %    Eosinophils % 0.0 0.0 - 6.0 %    Basophils % 0.4 0.0 - 2.0 %    Neutrophils Absolute 3.74 1.60 - 5.50 x10*3/uL    Immature Granulocytes Absolute, Automated 0.02 0.00 - 0.50 x10*3/uL    Lymphocytes Absolute 0.83 0.80 - 3.00 x10*3/uL    Monocytes Absolute 0.04 (L) 0.05 - 0.80 x10*3/uL    Eosinophils Absolute 0.00 0.00 - 0.40 x10*3/uL    Basophils Absolute 0.02 0.00 - 0.10 x10*3/uL   Comprehensive Metabolic Panel   Result Value Ref Range    Glucose 169 (H) 74 - 99 mg/dL    Sodium 137 136 - 145 mmol/L    Potassium 4.0 3.5 - 5.3 mmol/L    Chloride 101 98 - 107 mmol/L    Bicarbonate 26 21 - 32 mmol/L    Anion Gap 14 10 - 20 mmol/L    Urea Nitrogen 8 6 - 23 mg/dL    Creatinine 0.46 (L) 0.50 - 1.05 mg/dL    eGFR >90 >60 mL/min/1.73m*2    Calcium 8.6 8.6 - 10.3 mg/dL    Albumin 3.5 3.4 - 5.0 g/dL    Alkaline Phosphatase 63 33 - 136 U/L    Total Protein 7.4 6.4 - 8.2 g/dL    AST 14 9 - 39 U/L    Bilirubin, Total 0.8 0.0 - 1.2 mg/dL    ALT 13 7 - 45 U/L        ECG 12 lead    Result Date: 9/25/2024  Atrial fibrillation Left anterior fascicular block Nonspecific ST and T wave abnormality Abnormal ECG When compared with ECG of 29-APR-2022 07:10, Significant changes have occurred    CT angio chest for pulmonary embolism    Result Date: 9/24/2024  STUDY: CTA Angiogram of the Chest; 9/24/2024 6:18 PM. INDICATION: Right middle lobe consolidation with hypoxia. COMPARISON: CXR 9/24/2024. ACCESSION NUMBER(S): [id number] ORDERING CLINICIAN: JAMAL PEÑALOZA TECHNIQUE:  CTA of the chest was performed with intravenous contrast. Images are reviewed and processed at a workstation according to the CT angiogram protocol with 3-D and/or MIP post processing imaging generated.  Omnipaque 350 75 mL was administered intravenously. Automated mA/kV exposure control was  utilized and patient examination was performed in strict accordance with principles of ALARA. FINDINGS: Pulmonary arteries are adequately opacified without acute or chronic filling defects.  The thoracic aorta is normal in course and caliber without dissection or aneurysm. The heart is enlarged without pericardial effusion.  Thoracic lymph nodes are not enlarged. There is no pleural effusion, pleural thickening, or pneumothorax. The airways are patent. Atelectasis right middle lobe.  Hazy groundglass regions of airspace density within the left upper lobe and right lower lobe.  No suspicious pulmonary nodules. Elevation of the diaphragms. T7 compression fracture with 50% loss vertebral body height.  No suspicious bony lesions.    Negative for pulmonary embolism or thoracic aortic dissection. Hazy groundglass pulmonary infiltrates within the left upper lobe and right upper lobe. Elevation right diaphragm with compressive atelectasis right middle lobe. Cardiomegaly. Age indeterminate compression fracture T7 with 50% loss vertebral body height. No CT findings of pulmonary mass. Signed by Basil Plata MD    XR chest 2 views    Result Date: 9/24/2024  STUDY: Chest Radiographs;  9/24/2024 3:53PM INDICATION: Shortness of breath, hypoxia. COMPARISON: 4/29/2022 XR Chest. ACCESSION NUMBER(S): [id number] ORDERING CLINICIAN: JAMAL PEÑALOZA TECHNIQUE:  Frontal and lateral chest. FINDINGS: CARDIOMEDIASTINAL SILHOUETTE: Cardiomediastinal silhouette is normal in size and configuration.  LUNGS: There is right middle lobe consolidation.  Remainder the lungs are clear. ABDOMEN: No remarkable upper abdominal findings.  BONES: No acute osseous changes.    New right middle lobe consolidation.  Follow-up CT scan is recommended to differentiate underlying neoplasm versus pneumonic process. Signed by Bridger Golden M.D.     Assessment:  78-year-old woman with a history of COPD, bronchitis, chronic hypoxia, atrial fibrillation,  bacteremia, hysterectomy, diabetes, hypertension, osteoporosis, glaucoma, DJD, possible pulmonary hypertension and possible obstructive sleep apnea, admitted with shortness of breath, hypoxia and hypotension, and found to have a normal white blood count with the exception of an elevated eosinophil count, mildly elevated BNP and a CT angiogram of the chest showing no pulmonary embolism, but with left upper lobe groundglass changes.  The patient most likely has a mild COPD exacerbation, although there is no bronchospasm at the present time.  Her abnormal CT scan of the chest showing left upper lobe groundglass changes is most likely due to air trapping and/or possible mild congestive heart failure/pulmonary edema.  Pneumonia is unlikely.  Doubt eosinophilic pneumonia.  There is no evidence of interstitial lung disease, pneumothorax or malignancy.  The patient is using Timolol (beta-blocker) eyedrops, but doubt is related to her symptoms.  Obstructive sleep apnea cannot be excluded.  Overall, she appears to be clinically stable    Recommend:  1.  Continue DuoNeb, budesonide, formoterol, prednisone, Eliquis, Zithromax and ceftriaxone.  (Alternatively, could change to her home medications with a short course of oral Zithromax and a prednisone taper.)  2.  Keep oxygen saturation approximately 92 to 95%; home oxygen evaluation prior to discharge.  3.  Incentive spirometry and Acapella treatment.  4.  Sputum for culture if the patient is able to provide a specimen.  5.  Check procalcitonin level--pending.  6.  Follow-up with her outpatient pulmonologist, Dr. Savannah Chanel, after discharge.    Jaret Grant MD

## 2024-09-26 LAB
ANION GAP SERPL CALC-SCNC: 11 MMOL/L (ref 10–20)
BUN SERPL-MCNC: 10 MG/DL (ref 6–23)
CALCIUM SERPL-MCNC: 9.1 MG/DL (ref 8.6–10.3)
CHLORIDE SERPL-SCNC: 102 MMOL/L (ref 98–107)
CO2 SERPL-SCNC: 31 MMOL/L (ref 21–32)
CREAT SERPL-MCNC: 0.49 MG/DL (ref 0.5–1.05)
EGFRCR SERPLBLD CKD-EPI 2021: >90 ML/MIN/1.73M*2
ERYTHROCYTE [DISTWIDTH] IN BLOOD BY AUTOMATED COUNT: 13.2 % (ref 11.5–14.5)
GLUCOSE SERPL-MCNC: 136 MG/DL (ref 74–99)
HCT VFR BLD AUTO: 37.7 % (ref 36–46)
HGB BLD-MCNC: 12.2 G/DL (ref 12–16)
MCH RBC QN AUTO: 31.9 PG (ref 26–34)
MCHC RBC AUTO-ENTMCNC: 32.4 G/DL (ref 32–36)
MCV RBC AUTO: 99 FL (ref 80–100)
NRBC BLD-RTO: 0 /100 WBCS (ref 0–0)
PLATELET # BLD AUTO: 296 X10*3/UL (ref 150–450)
POTASSIUM SERPL-SCNC: 4.4 MMOL/L (ref 3.5–5.3)
RBC # BLD AUTO: 3.82 X10*6/UL (ref 4–5.2)
SODIUM SERPL-SCNC: 140 MMOL/L (ref 136–145)
WBC # BLD AUTO: 12.7 X10*3/UL (ref 4.4–11.3)

## 2024-09-26 PROCEDURE — 2500000001 HC RX 250 WO HCPCS SELF ADMINISTERED DRUGS (ALT 637 FOR MEDICARE OP): Performed by: PHARMACIST

## 2024-09-26 PROCEDURE — 2500000004 HC RX 250 GENERAL PHARMACY W/ HCPCS (ALT 636 FOR OP/ED): Performed by: NURSE PRACTITIONER

## 2024-09-26 PROCEDURE — 36415 COLL VENOUS BLD VENIPUNCTURE: CPT | Performed by: INTERNAL MEDICINE

## 2024-09-26 PROCEDURE — 2500000005 HC RX 250 GENERAL PHARMACY W/O HCPCS: Performed by: INTERNAL MEDICINE

## 2024-09-26 PROCEDURE — 2500000002 HC RX 250 W HCPCS SELF ADMINISTERED DRUGS (ALT 637 FOR MEDICARE OP, ALT 636 FOR OP/ED): Performed by: NURSE PRACTITIONER

## 2024-09-26 PROCEDURE — 94640 AIRWAY INHALATION TREATMENT: CPT

## 2024-09-26 PROCEDURE — 85027 COMPLETE CBC AUTOMATED: CPT | Performed by: INTERNAL MEDICINE

## 2024-09-26 PROCEDURE — 99233 SBSQ HOSP IP/OBS HIGH 50: CPT | Performed by: INTERNAL MEDICINE

## 2024-09-26 PROCEDURE — 2500000001 HC RX 250 WO HCPCS SELF ADMINISTERED DRUGS (ALT 637 FOR MEDICARE OP): Performed by: NURSE PRACTITIONER

## 2024-09-26 PROCEDURE — 1200000002 HC GENERAL ROOM WITH TELEMETRY DAILY

## 2024-09-26 PROCEDURE — 80048 BASIC METABOLIC PNL TOTAL CA: CPT | Performed by: INTERNAL MEDICINE

## 2024-09-26 RX ORDER — PREDNISONE 10 MG/1
10 TABLET ORAL DAILY
Status: DISCONTINUED | OUTPATIENT
Start: 2024-10-02 | End: 2024-09-26

## 2024-09-26 RX ORDER — PREDNISONE 5 MG/1
5 TABLET ORAL DAILY
Status: DISCONTINUED | OUTPATIENT
Start: 2024-10-05 | End: 2024-09-26

## 2024-09-26 RX ORDER — AZITHROMYCIN 500 MG/1
500 TABLET, FILM COATED ORAL EVERY 24 HOURS
Status: DISCONTINUED | OUTPATIENT
Start: 2024-09-26 | End: 2024-09-27 | Stop reason: HOSPADM

## 2024-09-26 RX ORDER — PREDNISONE 20 MG/1
20 TABLET ORAL DAILY
Status: DISCONTINUED | OUTPATIENT
Start: 2024-09-29 | End: 2024-09-26

## 2024-09-26 RX ORDER — PREDNISONE 10 MG/1
10 TABLET ORAL DAILY
Status: DISCONTINUED | OUTPATIENT
Start: 2024-10-03 | End: 2024-09-27 | Stop reason: HOSPADM

## 2024-09-26 RX ORDER — PREDNISONE 20 MG/1
20 TABLET ORAL DAILY
Status: DISCONTINUED | OUTPATIENT
Start: 2024-09-30 | End: 2024-09-27 | Stop reason: HOSPADM

## 2024-09-26 RX ORDER — PREDNISONE 5 MG/1
5 TABLET ORAL DAILY
Status: DISCONTINUED | OUTPATIENT
Start: 2024-10-06 | End: 2024-09-27 | Stop reason: HOSPADM

## 2024-09-26 ASSESSMENT — COGNITIVE AND FUNCTIONAL STATUS - GENERAL
MOBILITY SCORE: 24
WALKING IN HOSPITAL ROOM: A LITTLE
CLIMB 3 TO 5 STEPS WITH RAILING: A LITTLE
DAILY ACTIVITIY SCORE: 24
DAILY ACTIVITIY SCORE: 24
STANDING UP FROM CHAIR USING ARMS: A LITTLE
MOBILITY SCORE: 21

## 2024-09-26 ASSESSMENT — PAIN - FUNCTIONAL ASSESSMENT
PAIN_FUNCTIONAL_ASSESSMENT: 0-10

## 2024-09-26 ASSESSMENT — PAIN SCALES - GENERAL
PAINLEVEL_OUTOF10: 0 - NO PAIN

## 2024-09-26 NOTE — CARE PLAN
The patient's goals for the shift include no sob     The clinical goals for the shift include safety      Problem: Safety - Adult  Goal: Free from fall injury  Outcome: Progressing     Problem: Discharge Planning  Goal: Discharge to home or other facility with appropriate resources  Outcome: Progressing     Problem: Chronic Conditions and Co-morbidities  Goal: Patient's chronic conditions and co-morbidity symptoms are monitored and maintained or improved  Outcome: Progressing     Problem: Fall/Injury  Goal: Not fall by end of shift  Outcome: Progressing     Problem: Respiratory  Goal: No signs of respiratory distress (eg. Use of accessory muscles. Peds grunting)  Outcome: Progressing

## 2024-09-26 NOTE — PROGRESS NOTES
Mary Ann Forde is a 78 y.o. female on day 2 of admission presenting with Shortness of breath.      Subjective   Pt seen and examined.        Objective     Last Recorded Vitals  /81 (BP Location: Right arm, Patient Position: Lying)   Pulse 80   Temp 36.7 °C (98 °F) (Oral)   Resp 18   Wt 109 kg (240 lb)   SpO2 97%   Intake/Output last 3 Shifts:    Intake/Output Summary (Last 24 hours) at 9/26/2024 1034  Last data filed at 9/26/2024 0600  Gross per 24 hour   Intake 250 ml   Output 2700 ml   Net -2450 ml       Admission Weight  Weight: 109 kg (240 lb) (09/24/24 1518)    Daily Weight  09/25/24 : 109 kg (240 lb)      Physical Exam  Constitutional: No acute distress, awake, alert  Head/Neck: Neck supple  Respiratory/Thorax: Diminished breath sounds  Cardiovascular: Regular, rate and rhythm,  2+ equal pulses of the extremities, normal S 1and S 2  Gastrointestinal: Nondistended, soft, non-tender, no rebound tenderness or guarding  Extremities: No edema. No calf tenderness.  Neurological: Awake and alert. No focal neurological deficits  Psychological: Appropriate mood and behavior    Relevant Results  Results for orders placed or performed during the hospital encounter of 09/24/24 (from the past 24 hour(s))   Transthoracic Echo (TTE) Complete   Result Value Ref Range    AV pk les 1.44 m/s    LVOT diam 2.20 cm    LA vol index A/L 42.1 ml/m2    Tricuspid annular plane systolic excursion 4.1 cm    LV EF 78 %    RV free wall pk S' 14.30 cm/s    LVIDd 2.47 cm    RVSP 59.0 mmHg    Aortic Valve Area by Continuity of Peak Velocity 2.69 cm2    AV pk grad 8.3 mmHg    LV A4C EF 57.6    CBC   Result Value Ref Range    WBC 12.7 (H) 4.4 - 11.3 x10*3/uL    nRBC 0.0 0.0 - 0.0 /100 WBCs    RBC 3.82 (L) 4.00 - 5.20 x10*6/uL    Hemoglobin 12.2 12.0 - 16.0 g/dL    Hematocrit 37.7 36.0 - 46.0 %    MCV 99 80 - 100 fL    MCH 31.9 26.0 - 34.0 pg    MCHC 32.4 32.0 - 36.0 g/dL    RDW 13.2 11.5 - 14.5 %    Platelets 296 150 - 450 x10*3/uL    Basic Metabolic Panel   Result Value Ref Range    Glucose 136 (H) 74 - 99 mg/dL    Sodium 140 136 - 145 mmol/L    Potassium 4.4 3.5 - 5.3 mmol/L    Chloride 102 98 - 107 mmol/L    Bicarbonate 31 21 - 32 mmol/L    Anion Gap 11 10 - 20 mmol/L    Urea Nitrogen 10 6 - 23 mg/dL    Creatinine 0.49 (L) 0.50 - 1.05 mg/dL    eGFR >90 >60 mL/min/1.73m*2    Calcium 9.1 8.6 - 10.3 mg/dL        Transthoracic Echo (TTE) Complete   Final Result      CT angio chest for pulmonary embolism   Final Result   Negative for pulmonary embolism or thoracic aortic dissection.   Hazy groundglass pulmonary infiltrates within the left upper lobe and   right upper lobe.   Elevation right diaphragm with compressive atelectasis right middle   lobe.   Cardiomegaly.   Age indeterminate compression fracture T7 with 50% loss vertebral body   height.   No CT findings of pulmonary mass.   Signed by Basil Plata MD      XR chest 2 views   Final Result   New right middle lobe consolidation.  Follow-up CT scan is recommended   to differentiate underlying neoplasm versus pneumonic process.   Signed by Bridger Golden M.D.          Scheduled medications  [Held by provider] amLODIPine, 10 mg, oral, Daily  apixaban, 5 mg, oral, BID  atorvastatin, 20 mg, oral, q PM  azithromycin, 500 mg, intravenous, q24h  budesonide, 0.5 mg, nebulization, BID   And  formoterol, 20 mcg, nebulization, BID  cholecalciferol, 2,000 Units, oral, Daily  dorzolamide, 1 drop, Left Eye, TID  ipratropium-albuteroL, 3 mL, nebulization, 4x daily  magnesium oxide, 400 mg, oral, Daily  metoprolol tartrate, 25 mg, oral, BID  netarsudiL-latanoprost, 1 drop, Right Eye, Nightly  oxygen, , inhalation, Continuous - Inhalation  prednisoLONE acetate, 1 drop, Left Eye, BID  [START ON 9/27/2024] predniSONE, 30 mg, oral, Daily   Followed by  [START ON 9/30/2024] predniSONE, 20 mg, oral, Daily   Followed by  [START ON 10/3/2024] predniSONE, 10 mg, oral, Daily   Followed by  [START ON 10/6/2024]  predniSONE, 5 mg, oral, Daily  timolol, 1 drop, Both Eyes, Daily      Continuous medications     PRN medications  PRN medications: acetaminophen **OR** acetaminophen **OR** acetaminophen, ipratropium-albuteroL         Assessment/Plan                  Principal Problem:    Shortness of breath    Pneumonia  COPD exacerbation  Acute on chronic hypoxic respiratory failure     Plan:  Continue Rocephin/azithromycin   consult pulmonology  As needed DuoNeb  Continue home respiratory meds  Prednisone taper  Check procalcitonin/urine antigens  ECHO noted     atrial fibrillation  -Continue metoprolol and Eliquis     Hyperlipidemia  Atorvastatin 20 mg daily     Diabetes  -Currently on no meds will a lispro mild scale given she will be on prednisone      Hypertension  -Continue metoprolol, hold amlodipine for now as BP was soft in office afternoon     osteoporosis  On Fosamax weekly     glaucoma  -Continue home eyedrops        DVT prophylaxis-patient on Eliquis                 Melissa Albrecht MD

## 2024-09-26 NOTE — CARE PLAN
The patient's goals for the shift include  pain control    The clinical goals for the shift include safety      Problem: Pain - Adult  Goal: Verbalizes/displays adequate comfort level or baseline comfort level  Outcome: Progressing     Problem: Safety - Adult  Goal: Free from fall injury  Outcome: Progressing     Problem: Discharge Planning  Goal: Discharge to home or other facility with appropriate resources  Outcome: Progressing     Problem: Chronic Conditions and Co-morbidities  Goal: Patient's chronic conditions and co-morbidity symptoms are monitored and maintained or improved  Outcome: Progressing     Problem: Skin  Goal: Decreased wound size/increased tissue granulation at next dressing change  Outcome: Progressing  Goal: Participates in plan/prevention/treatment measures  Outcome: Progressing  Goal: Prevent/manage excess moisture  Outcome: Progressing  Goal: Prevent/minimize sheer/friction injuries  Outcome: Progressing  Goal: Promote/optimize nutrition  Outcome: Progressing  Goal: Promote skin healing  Outcome: Progressing     Problem: Fall/Injury  Goal: Not fall by end of shift  Outcome: Progressing  Goal: Be free from injury by end of the shift  Outcome: Progressing  Goal: Verbalize understanding of personal risk factors for fall in the hospital  Outcome: Progressing  Goal: Verbalize understanding of risk factor reduction measures to prevent injury from fall in the home  Outcome: Progressing  Goal: Use assistive devices by end of the shift  Outcome: Progressing  Goal: Pace activities to prevent fatigue by end of the shift  Outcome: Progressing     Problem: Pain  Goal: Takes deep breaths with improved pain control throughout the shift  Outcome: Progressing  Goal: Turns in bed with improved pain control throughout the shift  Outcome: Progressing  Goal: Walks with improved pain control throughout the shift  Outcome: Progressing  Goal: Performs ADL's with improved pain control throughout shift  Outcome:  Progressing  Goal: Participates in PT with improved pain control throughout the shift  Outcome: Progressing  Goal: Free from opioid side effects throughout the shift  Outcome: Progressing  Goal: Free from acute confusion related to pain meds throughout the shift  Outcome: Progressing

## 2024-09-26 NOTE — PROGRESS NOTES
"Mary Ann Forde is a 78 y.o. female on day 2 of admission presenting with Shortness of breath.    Subjective   Feels \"much better-ready to go\".  Denies shortness of breath or pain, but does admit to an occasional nonproductive cough.  On 4 L nasal cannula oxygen.  Procalcitonin level was normal.  BNP was 184.  Echocardiogram showed an LVEF of 80% with diastolic dysfunction, moderate tricuspid regurgitation and moderate pulmonary hypertension.     Objective   Physical Exam  Vitals  Blood pressure 117/81, pulse 80, temperature 36.7 °C (98 °F), temperature source Oral, resp. rate 18, height 1.753 m (5' 9\"), weight 109 kg (240 lb), SpO2 97%.  Intake/Output last 3 Shifts:  I/O last 3 completed shifts:  In: 300 (2.8 mL/kg) [IV Piggyback:300]  Out: 2700 (24.8 mL/kg) [Urine:2700 (0.7 mL/kg/hr)]  Weight: 108.9 kg     General-awake, alert and in no acute distress.  Lungs-clear, without wheezes, rales or rhonchi.  Heart-regular rate and rhythm.  Abdomen-positive bowel sounds.  Extremities-no edema.  Skin-no rashes.    Scheduled medications  [Held by provider] amLODIPine, 10 mg, oral, Daily  apixaban, 5 mg, oral, BID  atorvastatin, 20 mg, oral, q PM  azithromycin, 500 mg, intravenous, q24h  budesonide, 0.5 mg, nebulization, BID   And  formoterol, 20 mcg, nebulization, BID  cholecalciferol, 2,000 Units, oral, Daily  dorzolamide, 1 drop, Left Eye, TID  ipratropium-albuteroL, 3 mL, nebulization, 4x daily  magnesium oxide, 400 mg, oral, Daily  metoprolol tartrate, 25 mg, oral, BID  netarsudiL-latanoprost, 1 drop, Right Eye, Nightly  oxygen, , inhalation, Continuous - Inhalation  prednisoLONE acetate, 1 drop, Left Eye, BID  predniSONE, 40 mg, oral, Daily  timolol, 1 drop, Both Eyes, Daily      Continuous medications     PRN medications  PRN medications: acetaminophen **OR** acetaminophen **OR** acetaminophen, ipratropium-albuteroL     Results for orders placed or performed during the hospital encounter of 09/24/24 (from the past 24 " hour(s))   Transthoracic Echo (TTE) Complete   Result Value Ref Range    AV pk les 1.44 m/s    LVOT diam 2.20 cm    LA vol index A/L 42.1 ml/m2    Tricuspid annular plane systolic excursion 4.1 cm    LV EF 78 %    RV free wall pk S' 14.30 cm/s    LVIDd 2.47 cm    RVSP 59.0 mmHg    Aortic Valve Area by Continuity of Peak Velocity 2.69 cm2    AV pk grad 8.3 mmHg    LV A4C EF 57.6    CBC   Result Value Ref Range    WBC 12.7 (H) 4.4 - 11.3 x10*3/uL    nRBC 0.0 0.0 - 0.0 /100 WBCs    RBC 3.82 (L) 4.00 - 5.20 x10*6/uL    Hemoglobin 12.2 12.0 - 16.0 g/dL    Hematocrit 37.7 36.0 - 46.0 %    MCV 99 80 - 100 fL    MCH 31.9 26.0 - 34.0 pg    MCHC 32.4 32.0 - 36.0 g/dL    RDW 13.2 11.5 - 14.5 %    Platelets 296 150 - 450 x10*3/uL   Basic Metabolic Panel   Result Value Ref Range    Glucose 136 (H) 74 - 99 mg/dL    Sodium 140 136 - 145 mmol/L    Potassium 4.4 3.5 - 5.3 mmol/L    Chloride 102 98 - 107 mmol/L    Bicarbonate 31 21 - 32 mmol/L    Anion Gap 11 10 - 20 mmol/L    Urea Nitrogen 10 6 - 23 mg/dL    Creatinine 0.49 (L) 0.50 - 1.05 mg/dL    eGFR >90 >60 mL/min/1.73m*2    Calcium 9.1 8.6 - 10.3 mg/dL      Assessment:  78-year-old woman with a history of COPD, bronchitis, chronic hypoxia, atrial fibrillation, bacteremia, hysterectomy, diabetes, hypertension, osteoporosis, glaucoma, DJD, possible pulmonary hypertension and possible obstructive sleep apnea, admitted with shortness of breath, hypoxia and hypotension, and found to have a normal white blood count with the exception of an elevated eosinophil count, mildly elevated BNP and a CT angiogram of the chest showing no pulmonary embolism, but with left upper lobe groundglass changes:  The patient most likely has a mild COPD exacerbation, although there is no bronchospasm at the present time.  Her abnormal CT scan of the chest showing left upper lobe groundglass changes is most likely due to air trapping/or possible mild congestive heart failure/pulmonary edema.  Pneumonia is  unlikely.  She did have an elevation of her eosinophil count on admission, suggestive of possible allergic asthma or drug reaction.  There is no evidence of eosinophilic pneumonia, interstitial lung disease, pneumothorax or malignancy.  She has moderate pulmonary hypertension, most likely due to left heart failure.  Obstructive sleep apnea cannot be excluded.      Recommend:  1.  Continue DuoNeb, budesonide, formoterol, Zithromax and Eliquis (although would consider changing her to her home medications of albuterol and Breo).  2.  Begin prednisone taper over the next 12 days.  3.  Incentive spirometry and Acapella treatment.  4.  Keep oxygen saturation approximately 92 to 95%; home oxygen evaluation prior to discharge.  5.  Check sputum culture if the patient is able to provide a specimen.  6.  Gentle diuresis with Lasix as blood pressure and kidney function allow.  7.  Follow-up with her outpatient pulmonologist, Dr. Savannah Chanel, after discharge.  8.  The patient appears to be stable for discharge from a pulmonary standpoint.    Jaret Grant MD

## 2024-09-27 ENCOUNTER — PHARMACY VISIT (OUTPATIENT)
Dept: PHARMACY | Facility: CLINIC | Age: 79
End: 2024-09-27
Payer: MEDICARE

## 2024-09-27 VITALS
OXYGEN SATURATION: 99 % | DIASTOLIC BLOOD PRESSURE: 70 MMHG | RESPIRATION RATE: 16 BRPM | HEART RATE: 69 BPM | TEMPERATURE: 97.1 F | BODY MASS INDEX: 35.55 KG/M2 | WEIGHT: 240 LBS | HEIGHT: 69 IN | SYSTOLIC BLOOD PRESSURE: 106 MMHG

## 2024-09-27 LAB
ANION GAP SERPL CALC-SCNC: 13 MMOL/L (ref 10–20)
BUN SERPL-MCNC: 12 MG/DL (ref 6–23)
CALCIUM SERPL-MCNC: 9.1 MG/DL (ref 8.6–10.3)
CHLORIDE SERPL-SCNC: 102 MMOL/L (ref 98–107)
CO2 SERPL-SCNC: 32 MMOL/L (ref 21–32)
CREAT SERPL-MCNC: 0.54 MG/DL (ref 0.5–1.05)
EGFRCR SERPLBLD CKD-EPI 2021: >90 ML/MIN/1.73M*2
ERYTHROCYTE [DISTWIDTH] IN BLOOD BY AUTOMATED COUNT: 13.2 % (ref 11.5–14.5)
GLUCOSE SERPL-MCNC: 86 MG/DL (ref 74–99)
HCT VFR BLD AUTO: 39.9 % (ref 36–46)
HGB BLD-MCNC: 12.8 G/DL (ref 12–16)
MCH RBC QN AUTO: 31.9 PG (ref 26–34)
MCHC RBC AUTO-ENTMCNC: 32.1 G/DL (ref 32–36)
MCV RBC AUTO: 100 FL (ref 80–100)
NRBC BLD-RTO: 0 /100 WBCS (ref 0–0)
PLATELET # BLD AUTO: 284 X10*3/UL (ref 150–450)
POTASSIUM SERPL-SCNC: 4.1 MMOL/L (ref 3.5–5.3)
RBC # BLD AUTO: 4.01 X10*6/UL (ref 4–5.2)
SODIUM SERPL-SCNC: 143 MMOL/L (ref 136–145)
WBC # BLD AUTO: 9.5 X10*3/UL (ref 4.4–11.3)

## 2024-09-27 PROCEDURE — 36415 COLL VENOUS BLD VENIPUNCTURE: CPT | Performed by: INTERNAL MEDICINE

## 2024-09-27 PROCEDURE — 2500000001 HC RX 250 WO HCPCS SELF ADMINISTERED DRUGS (ALT 637 FOR MEDICARE OP): Performed by: NURSE PRACTITIONER

## 2024-09-27 PROCEDURE — 99239 HOSP IP/OBS DSCHRG MGMT >30: CPT | Performed by: INTERNAL MEDICINE

## 2024-09-27 PROCEDURE — 2500000004 HC RX 250 GENERAL PHARMACY W/ HCPCS (ALT 636 FOR OP/ED): Performed by: NURSE PRACTITIONER

## 2024-09-27 PROCEDURE — 85027 COMPLETE CBC AUTOMATED: CPT | Performed by: INTERNAL MEDICINE

## 2024-09-27 PROCEDURE — 94640 AIRWAY INHALATION TREATMENT: CPT

## 2024-09-27 PROCEDURE — RXMED WILLOW AMBULATORY MEDICATION CHARGE

## 2024-09-27 PROCEDURE — 2500000004 HC RX 250 GENERAL PHARMACY W/ HCPCS (ALT 636 FOR OP/ED): Performed by: INTERNAL MEDICINE

## 2024-09-27 PROCEDURE — 80048 BASIC METABOLIC PNL TOTAL CA: CPT | Performed by: INTERNAL MEDICINE

## 2024-09-27 PROCEDURE — 2500000005 HC RX 250 GENERAL PHARMACY W/O HCPCS: Performed by: INTERNAL MEDICINE

## 2024-09-27 PROCEDURE — 2500000002 HC RX 250 W HCPCS SELF ADMINISTERED DRUGS (ALT 637 FOR MEDICARE OP, ALT 636 FOR OP/ED): Performed by: NURSE PRACTITIONER

## 2024-09-27 RX ORDER — IPRATROPIUM BROMIDE AND ALBUTEROL SULFATE 2.5; .5 MG/3ML; MG/3ML
3 SOLUTION RESPIRATORY (INHALATION)
Status: DISCONTINUED | OUTPATIENT
Start: 2024-09-27 | End: 2024-09-27 | Stop reason: HOSPADM

## 2024-09-27 RX ORDER — PREDNISONE 10 MG/1
TABLET ORAL
Qty: 17 TABLET | Refills: 0 | Status: SHIPPED | OUTPATIENT
Start: 2024-09-28 | End: 2024-10-08

## 2024-09-27 RX ORDER — FUROSEMIDE 10 MG/ML
20 INJECTION INTRAMUSCULAR; INTRAVENOUS ONCE
Status: COMPLETED | OUTPATIENT
Start: 2024-09-27 | End: 2024-09-27

## 2024-09-27 RX ORDER — AZITHROMYCIN 500 MG/1
500 TABLET, FILM COATED ORAL EVERY 24 HOURS
Qty: 1 TABLET | Refills: 0 | Status: SHIPPED | OUTPATIENT
Start: 2024-09-28 | End: 2024-09-29

## 2024-09-27 RX ORDER — AMLODIPINE BESYLATE 10 MG/1
5 TABLET ORAL DAILY
Start: 2024-09-27 | End: 2024-10-27

## 2024-09-27 NOTE — NURSING NOTE
Upon arrival at the patients bedside the patient was AO+4, vss, sitting up in the chair with her daughter and son in law at bedside receiving a breathing tx. The patient ambulates with stand by assistance, steady gait , no noted distress, ABC's intact, MSP+4. The patient denies increased sob, c/p, n/v, dizziness or weakness, the patient reports  that she does not feels as sob as she did when she arrived. IS education, breathing tx care transferred without incidence.

## 2024-09-27 NOTE — PROGRESS NOTES
"Mary Ann Forde is a 78 y.o. female on day 3 of admission presenting with Shortness of breath.    Subjective   Patient states that she feels \"good\".  She denies shortness of breath or pain, but does admit to an occasional cough productive of clear sputum without blood.  On 3 L nasal cannula oxygen.     Objective   Physical Exam  Vitals  Blood pressure 118/75, pulse 72, temperature 36.1 °C (97 °F), resp. rate 16, height 1.753 m (5' 9\"), weight 109 kg (240 lb), SpO2 97%.  Intake/Output last 3 Shifts:  I/O last 3 completed shifts:  In: 1810 (16.6 mL/kg) [P.O.:1560; IV Piggyback:250]  Out: 2050 (18.8 mL/kg) [Urine:2050 (0.5 mL/kg/hr)]  Weight: 108.9 kg     General-awake, alert and in no acute distress.  Lungs-clear, without wheezes, rales or rhonchi.  Heart-irregularly irregular.  Abdomen-positive bowel sounds.  Extremities-no edema.  Skin-no rashes.    Scheduled medications  [Held by provider] amLODIPine, 10 mg, oral, Daily  apixaban, 5 mg, oral, BID  atorvastatin, 20 mg, oral, q PM  azithromycin, 500 mg, oral, q24h  budesonide, 0.5 mg, nebulization, BID   And  formoterol, 20 mcg, nebulization, BID  cholecalciferol, 2,000 Units, oral, Daily  dorzolamide, 1 drop, Left Eye, TID  ipratropium-albuteroL, 3 mL, nebulization, TID  magnesium oxide, 400 mg, oral, Daily  metoprolol tartrate, 25 mg, oral, BID  netarsudiL-latanoprost, 1 drop, Right Eye, Nightly  oxygen, , inhalation, Continuous - Inhalation  prednisoLONE acetate, 1 drop, Left Eye, BID  predniSONE, 30 mg, oral, Daily   Followed by  [START ON 9/30/2024] predniSONE, 20 mg, oral, Daily   Followed by  [START ON 10/3/2024] predniSONE, 10 mg, oral, Daily   Followed by  [START ON 10/6/2024] predniSONE, 5 mg, oral, Daily  timolol, 1 drop, Both Eyes, Daily      Continuous medications     PRN medications  PRN medications: acetaminophen **OR** acetaminophen **OR** acetaminophen, ipratropium-albuteroL     Results for orders placed or performed during the hospital encounter of " 09/24/24 (from the past 24 hour(s))   CBC   Result Value Ref Range    WBC 9.5 4.4 - 11.3 x10*3/uL    nRBC 0.0 0.0 - 0.0 /100 WBCs    RBC 4.01 4.00 - 5.20 x10*6/uL    Hemoglobin 12.8 12.0 - 16.0 g/dL    Hematocrit 39.9 36.0 - 46.0 %     80 - 100 fL    MCH 31.9 26.0 - 34.0 pg    MCHC 32.1 32.0 - 36.0 g/dL    RDW 13.2 11.5 - 14.5 %    Platelets 284 150 - 450 x10*3/uL   Basic Metabolic Panel   Result Value Ref Range    Glucose 86 74 - 99 mg/dL    Sodium 143 136 - 145 mmol/L    Potassium 4.1 3.5 - 5.3 mmol/L    Chloride 102 98 - 107 mmol/L    Bicarbonate 32 21 - 32 mmol/L    Anion Gap 13 10 - 20 mmol/L    Urea Nitrogen 12 6 - 23 mg/dL    Creatinine 0.54 0.50 - 1.05 mg/dL    eGFR >90 >60 mL/min/1.73m*2    Calcium 9.1 8.6 - 10.3 mg/dL      Assessment:  Patient with COPD, bronchitis, chronic hypoxia, atrial fibrillation, moderate pulmonary hypertension and possible obstructive sleep apnea, admitted with shortness of breath, hypoxia and hypotension and found to have an abnormal CT scan of the chest showing no pulmonary embolism, but with left upper lobe groundglass changes, most likely due to air trapping/congestive heart failure/pulmonary edema, with a superimposed mild COPD exacerbation, which has resolved.  There is no evidence of pneumonia, interstitial lung disease, pneumothorax or malignancy.  She was noted to have an elevated eosinophil count on admission suggestive of possible allergic asthma or allergic drug reaction.  Obstructive sleep apnea cannot be excluded.  There is no bronchospasm.    Recommend:  1.  Continue DuoNeb, budesonide, formoterol, Zithromax, prednisone taper and Eliquis; change to her home medications of albuterol and Breo upon discharge.  2.  Incentive spirometry and Acapella treatment.  3.  Keep oxygen saturation approximately 92 to 95%; home oxygen evaluation prior to discharge.  4.  Gentle diuresis as blood pressure and kidney function allow.  5.  Follow-up with her outpatient  pulmonologist, Dr. Savannah Chanel, after discharge.  6.  The patient appears to be clinically stable for discharge from a pulmonary standpoint.    Jaret Grant MD

## 2024-09-27 NOTE — NURSING NOTE
Discharge instructions provided using teach back method. Pt's health related  risk factors discussed with pt. pt educated to look for any worsening sign and symptoms. Pt educated to seek medical attention if experience any medical emergency. Pt aware to follow up with outpatient clinics as scheduled. Home going meds reviewed with pt. Pt verbalized understanding of disposition and discharge instructions. All questions answered to patient's satisfaction and within nursing scope of practice. Vitals stable, IV removed by patient. Patient educated on the importance of not removing Ivs.

## 2024-09-27 NOTE — DISCHARGE SUMMARY
Discharge Diagnosis  Shortness of breath    Issues Requiring Follow-Up  PCP follow up in 1-2 weeks    Discharge Meds     Medication List      START taking these medications     azithromycin 500 mg tablet; Commonly known as: Zithromax; Take 1 tablet   (500 mg) by mouth once every 24 hours for 1 day. Do not fill before   September 28, 2024.; Start taking on: September 28, 2024   predniSONE 5 mg tablet; Commonly known as: Deltasone; Take 6 tablets (30   mg) by mouth once daily for 2 days, THEN 4 tablets (20 mg) once daily for   3 days, THEN 2 tablets (10 mg) once daily for 3 days, THEN 1 tablet (5 mg)   once daily for 3 days.; Start taking on: September 28, 2024     CHANGE how you take these medications     albuterol 90 mcg/actuation inhaler; INHALE 1 PUFF BY MOUTH EVERY 4 HOURS   AS NEEDED; What changed: how to take this, reasons to take this,   additional instructions   ipratropium-albuteroL 0.5-2.5 mg/3 mL nebulizer solution; Commonly known   as: Duo-Neb; Take 3 mL by nebulization 4 times a day.; What changed: when   to take this, reasons to take this   timolol 0.5 % ophthalmic solution; Commonly known as: Timoptic;   Administer 1 drop into both eyes once daily.; What changed: how to take   this     CONTINUE taking these medications     alendronate 70 mg tablet; Commonly known as: Fosamax; Take 1 tablet (70   mg) by mouth every 7 days. Take in the morning with a full glass of water,   on an empty stomach, and do not take anything else by mouth or lie down   for the next 30 min.   amLODIPine 10 mg tablet; Commonly known as: Norvasc; Take 0.5 tablets (5   mg) by mouth once daily. for blood pressure   apixaban 5 mg tablet; Commonly known as: Eliquis; Take 1 tablet (5 mg)   by mouth 2 times a day.   atorvastatin 20 mg tablet; Commonly known as: Lipitor; Take 1 tablet (20   mg) by mouth once daily in the evening.   calcium carbonate 200 mg calcium chewable tablet; Commonly known as:   Tums; Chew 3 tablets (1,500 mg) once  daily.   fluticasone furoate-vilanteroL 100-25 mcg/dose inhaler; Commonly known   as: Breo Ellipta; Inhale 1 puff once daily.   magnesium oxide 400 mg (241.3 mg magnesium) tablet; Commonly known as:   Mag-Ox   metoprolol tartrate 25 mg tablet; Commonly known as: Lopressor; Take 1   tablet (25 mg) by mouth 2 times a day.   prednisoLONE acetate 1 % ophthalmic suspension; Commonly known as:   Pred-Forte   Rocklatan 0.02-0.005 % drops; Generic drug: netarsudiL-latanoprost   Vitamin D3 50 MCG (2000 UT) tablet; Generic drug: cholecalciferol     ASK your doctor about these medications     dorzolamide 2 % ophthalmic solution; Commonly known as: Trusopt;   Administer 1 drop into the left eye 3 times a day.       Test Results Pending At Discharge  Pending Labs       No current pending labs.            Hospital Course     Pneumonia  COPD exacerbation  Acute on chronic hypoxic respiratory failure     Plan:  Continue azithromycin   consult pulmonology  As needed DuoNeb  Continue home respiratory meds  Prednisone taper  ECHO noted     atrial fibrillation  -Continue metoprolol and Eliquis     Hyperlipidemia  Atorvastatin 20 mg daily     Diabetes  -Currently on no meds will a lispro mild scale given she will be on prednisone      Hypertension  -Continue metoprolol, hold amlodipine for now as BP was soft in office afternoon     osteoporosis  On Fosamax weekly     glaucoma  -Continue home eyedrops     patient is doing much better today.  She is back to her baseline oxygen requirements.  She wants to go home today.  She will be discharged on oral prednisone taper course.  Advised her to follow with her PCP as outpatient in 1 to 2 weeks.      Discharge time spent more than 30 minutes.    Pertinent Physical Exam At Time of Discharge  Physical Exam  Constitutional: No acute distress, awake, alert  Head/Neck: Neck supple  Respiratory/Thorax: Diminished breath sounds  Cardiovascular: Regular, rate and rhythm,  2+ equal pulses of the  extremities, normal S 1and S 2  Gastrointestinal: Nondistended, soft, non-tender, no rebound tenderness or guarding  Extremities: No edema. No calf tenderness.  Neurological: Awake and alert. No focal neurological deficits  Psychological: Appropriate mood and behavior  Outpatient Follow-Up  Future Appointments   Date Time Provider Department Center   10/22/2024  3:15 PM Jean Pierre Fofana MD [id number]         Melissa Albrecht MD

## 2024-09-27 NOTE — CARE PLAN
Problem: Pain - Adult  Goal: Verbalizes/displays adequate comfort level or baseline comfort level  Outcome: Progressing     Problem: Safety - Adult  Goal: Free from fall injury  Outcome: Progressing     Problem: Discharge Planning  Goal: Discharge to home or other facility with appropriate resources  Outcome: Progressing     Problem: Chronic Conditions and Co-morbidities  Goal: Patient's chronic conditions and co-morbidity symptoms are monitored and maintained or improved  Outcome: Progressing     Problem: Skin  Goal: Decreased wound size/increased tissue granulation at next dressing change  Outcome: Progressing  Goal: Promote/optimize nutrition  Outcome: Progressing     Problem: Fall/Injury  Goal: Not fall by end of shift  Outcome: Progressing

## 2024-09-28 LAB
Q ONSET: 214 MS
QRS COUNT: 13 BEATS
QRS DURATION: 82 MS
QT INTERVAL: 352 MS
QTC CALCULATION(BAZETT): 405 MS
QTC FREDERICIA: 387 MS
R AXIS: -50 DEGREES
T AXIS: 13 DEGREES
T OFFSET: 390 MS
VENTRICULAR RATE: 80 BPM

## 2024-09-29 ENCOUNTER — PATIENT OUTREACH (OUTPATIENT)
Dept: HOME HEALTH SERVICES | Age: 79
End: 2024-09-29
Payer: MEDICARE

## 2024-10-03 ENCOUNTER — APPOINTMENT (OUTPATIENT)
Dept: PRIMARY CARE | Facility: CLINIC | Age: 79
End: 2024-10-03
Payer: MEDICARE

## 2024-10-04 ENCOUNTER — OFFICE VISIT (OUTPATIENT)
Dept: PRIMARY CARE | Facility: CLINIC | Age: 79
End: 2024-10-04
Payer: MEDICARE

## 2024-10-04 VITALS
HEIGHT: 69 IN | SYSTOLIC BLOOD PRESSURE: 115 MMHG | BODY MASS INDEX: 36.29 KG/M2 | WEIGHT: 245 LBS | DIASTOLIC BLOOD PRESSURE: 79 MMHG | HEART RATE: 68 BPM

## 2024-10-04 DIAGNOSIS — J44.1 CHRONIC OBSTRUCTIVE PULMONARY DISEASE WITH ACUTE EXACERBATION (MULTI): Primary | ICD-10-CM

## 2024-10-04 DIAGNOSIS — I48.91 ATRIAL FIBRILLATION, UNSPECIFIED TYPE (MULTI): ICD-10-CM

## 2024-10-04 DIAGNOSIS — J44.9 CHRONIC OBSTRUCTIVE PULMONARY DISEASE, UNSPECIFIED COPD TYPE (MULTI): Primary | ICD-10-CM

## 2024-10-04 DIAGNOSIS — J44.9 CHRONIC OBSTRUCTIVE PULMONARY DISEASE, UNSPECIFIED: ICD-10-CM

## 2024-10-04 DIAGNOSIS — I10 ESSENTIAL HYPERTENSION: ICD-10-CM

## 2024-10-04 DIAGNOSIS — Z23 FLU VACCINE NEED: ICD-10-CM

## 2024-10-04 DIAGNOSIS — E78.5 BORDERLINE HYPERLIPIDEMIA: ICD-10-CM

## 2024-10-04 DIAGNOSIS — R73.03 PREDIABETES: ICD-10-CM

## 2024-10-04 DIAGNOSIS — M81.0 OSTEOPOROSIS WITHOUT CURRENT PATHOLOGICAL FRACTURE, UNSPECIFIED OSTEOPOROSIS TYPE: ICD-10-CM

## 2024-10-04 RX ORDER — IPRATROPIUM BROMIDE AND ALBUTEROL SULFATE 2.5; .5 MG/3ML; MG/3ML
3 SOLUTION RESPIRATORY (INHALATION) EVERY 6 HOURS PRN
Qty: 180 ML | Refills: 1 | Status: SHIPPED | OUTPATIENT
Start: 2024-10-04 | End: 2025-01-02

## 2024-10-04 RX ORDER — FLUTICASONE FUROATE AND VILANTEROL 100; 25 UG/1; UG/1
1 POWDER RESPIRATORY (INHALATION) DAILY
Qty: 60 EACH | Refills: 2 | Status: SHIPPED | OUTPATIENT
Start: 2024-10-04

## 2024-10-04 ASSESSMENT — ENCOUNTER SYMPTOMS
PALPITATIONS: 0
NUMBNESS: 0
RHINORRHEA: 0
FATIGUE: 0
UNEXPECTED WEIGHT CHANGE: 0
CHEST TIGHTNESS: 0
DIZZINESS: 0
SHORTNESS OF BREATH: 0
FEVER: 0
SINUS PAIN: 0
SINUS PRESSURE: 0
COUGH: 0

## 2024-10-04 ASSESSMENT — LIFESTYLE VARIABLES
AUDIT-C TOTAL SCORE: 1
HOW OFTEN DO YOU HAVE A DRINK CONTAINING ALCOHOL: MONTHLY OR LESS
SKIP TO QUESTIONS 9-10: 1
HOW MANY STANDARD DRINKS CONTAINING ALCOHOL DO YOU HAVE ON A TYPICAL DAY: 1 OR 2
HOW OFTEN DO YOU HAVE SIX OR MORE DRINKS ON ONE OCCASION: NEVER

## 2024-10-04 ASSESSMENT — PATIENT HEALTH QUESTIONNAIRE - PHQ9
SUM OF ALL RESPONSES TO PHQ9 QUESTIONS 1 AND 2: 2
10. IF YOU CHECKED OFF ANY PROBLEMS, HOW DIFFICULT HAVE THESE PROBLEMS MADE IT FOR YOU TO DO YOUR WORK, TAKE CARE OF THINGS AT HOME, OR GET ALONG WITH OTHER PEOPLE: SOMEWHAT DIFFICULT
1. LITTLE INTEREST OR PLEASURE IN DOING THINGS: SEVERAL DAYS
2. FEELING DOWN, DEPRESSED OR HOPELESS: SEVERAL DAYS

## 2024-10-04 NOTE — PROGRESS NOTES

## 2024-10-04 NOTE — PROGRESS NOTES
Primary Care Visit Note    Patient: Mary Ann Forde, Age: 78 y.o., SEX: female , MRN:[id number],   PCP: Coretta Silver MD        Resident:  Alek Hunt DO   Preferred Pharmacy:   [facility]/pharmacy #[id number] [place], [place] - [address] [address]  [address] [facility] [place] [address]  Phone: [phone] Fax: [phone]          Chief Complaint     Patient: Mary Ann Forde is a 78 y.o. female who presented to the clinic for   Chief Complaint   Patient presents with    Hospital Follow-up        Subjective      HPI    Mary Ann Forde is a 78 y.o. year old female patient with a PMH significant for hypertension, pulm htn, HFpEF 80% hyperlipidemia, COPD intermittently on 3-4 L NC at home, T2DM osteoporosis, obstructive sleep apnea on CPAP, afib on anticoagulation presents to the clinic for follow-up after hospital admission. Patient presented to the clinic approximately 1 week prior for shortness of breath and hypoxia. During the visit, it was determined that the patient would benefit from presenting to the [facility] ED and 911 was contacted. Per notes, during her hospital stay it was determined that patient likely had acute hypoxic respiratory failure secondary to either COPD exacerbation or pneumonia. Patient had ground glass opacities on upper left lung lobe and new onset consolidation in right middle lobe. She was treated with pulmonary hygiene therapies and a course of steroids, Azithromycin, and Rocephin. She was discharged with a course of prednisone tape and instructions to follow-up with her pulmonologist. Patient is reports significant improvement in her symptoms and is back on her home dose of oxygen. She denies chest tightness, chest pain, cough, or shortness of breath. Her medications were reviewed, pharmacy updated, notes reviewed, prior labs reviewed.     ROS   Review of Systems   Constitutional:  Negative for fatigue, fever and unexpected weight change.   HENT:  Negative for  rhinorrhea, sinus pressure and sinus pain.    Respiratory:  Negative for cough, chest tightness and shortness of breath.    Cardiovascular:  Negative for chest pain and palpitations.   Neurological:  Negative for dizziness and numbness.      Past History     PMHx:    has a past medical history of Body mass index (BMI) 37.0-37.9, adult (2019), Body mass index (BMI) 38.0-38.9, adult (2022), Drug-induced obesity (2022), Morbid (severe) obesity due to excess calories (Multi) (2022), and Personal history of other specified conditions (2020).   Allergies:   No Known Allergies   Surgical Hx:   Past Surgical History:   Procedure Laterality Date    HYSTERECTOMY  2013    Hysterectomy      Social HX:   Social History     Tobacco Use    Smoking status: Former     Current packs/day: 0.00     Average packs/day: 1 pack/day for 20.0 years (20.0 ttl pk-yrs)     Types: Cigarettes     Start date:      Quit date:      Years since quittin.7     Passive exposure: Past    Smokeless tobacco: Never   Substance Use Topics    Alcohol use: Yes     Comment: socially    Drug use: Never      MEDS:   Current Outpatient Medications   Medication Instructions    albuterol 90 mcg/actuation inhaler INHALE 1 PUFF BY MOUTH EVERY 4 HOURS AS NEEDED    alendronate (FOSAMAX) 70 mg, oral, Every 7 days, Take in the morning with a full glass of water, on an empty stomach, and do not take anything else by mouth or lie down for the next 30 min.    amLODIPine (NORVASC) 5 mg, oral, Daily, for blood pressure    apixaban (ELIQUIS) 5 mg, oral, 2 times daily    atorvastatin (LIPITOR) 20 mg, oral, Every evening    calcium carbonate (TUMS) 1,500 mg, oral, Daily    cholecalciferol (VITAMIN D3) 50 mcg, oral, Daily    dorzolamide (Trusopt) 2 % ophthalmic solution 1 drop, Left Eye, 3 times daily    fluticasone furoate-vilanteroL (Breo Ellipta) 100-25 mcg/dose inhaler 1 puff, inhalation, Daily    ipratropium-albuteroL (Duo-Neb)  0.5-2.5 mg/3 mL nebulizer solution 3 mL, nebulization, Every 6 hours PRN    magnesium oxide (MAG-OX) 400 mg, oral, Daily    metoprolol tartrate (LOPRESSOR) 25 mg, oral, 2 times daily    netarsudiL-latanoprost (Rocklatan) 0.02-0.005 % drops 1 drop, Right Eye, Nightly    prednisoLONE acetate (Pred-Forte) 1 % ophthalmic suspension 1 drop, Left Eye, 2 times daily    predniSONE (Deltasone) 10 mg tablet Take 3 tablets (30 mg) by mouth once daily for 2 days, THEN 2 tablets (20 mg) once daily for 3 days, THEN 1 tablet (10 mg) once daily for 3 days, THEN 0.5 tablets (5 mg) once daily for 3 days.    timolol (Timoptic) 0.5 % ophthalmic solution 1 drop, Both Eyes, Daily      Objective      Visit Vitals  /79 (BP Location: Right arm, Patient Position: Sitting, BP Cuff Size: Large adult)   Pulse 68      BMI Readings from Last 15 Encounters:   10/04/24 36.18 kg/m²   09/25/24 35.44 kg/m²   09/24/24 35.87 kg/m²   06/05/24 35.57 kg/m²   03/05/24 35.47 kg/m²   10/03/23 35.44 kg/m²   06/27/23 34.70 kg/m²   04/12/23 36.03 kg/m²   02/24/23 36.48 kg/m²   02/08/23 36.03 kg/m²   11/09/22 37.07 kg/m²   04/05/22 38.69 kg/m²   11/30/21 38.69 kg/m²   11/05/21 38.69 kg/m²   09/21/21 38.69 kg/m²      Lab Results   Component Value Date    HGBA1C 5.8 (H) 06/05/2024      Lab Results   Component Value Date    HGBA1C 5.8 (H) 06/05/2024    HGBA1C 5.6 10/03/2023    HGBA1C 6.3 (A) 06/07/2023     Lab Results   Component Value Date    LDLCALC 78 06/05/2024    CREATININE 0.54 09/27/2024      Lab Results   Component Value Date    WBC 9.5 09/27/2024    HGB 12.8 09/27/2024    HCT 39.9 09/27/2024     09/27/2024     09/27/2024        Chemistry    Lab Results   Component Value Date/Time     09/27/2024 0626    K 4.1 09/27/2024 0626     09/27/2024 0626    CO2 32 09/27/2024 0626    BUN 12 09/27/2024 0626    CREATININE 0.54 09/27/2024 0626    Lab Results   Component Value Date/Time    CALCIUM 9.1 09/27/2024 0626    ALKPHOS 63 09/25/2024  0642    AST 14 09/25/2024 0642    ALT 13 09/25/2024 0642    BILITOT 0.8 09/25/2024 0642             Physical Exam  Physical Exam  Constitutional:       General: She is not in acute distress.     Appearance: She is obese.   Eyes:      Extraocular Movements: Extraocular movements intact.      Conjunctiva/sclera: Conjunctivae normal.   Cardiovascular:      Rate and Rhythm: Normal rate and regular rhythm.      Pulses: Normal pulses.      Heart sounds: Normal heart sounds.   Pulmonary:      Breath sounds: Wheezing present.      Comments: Mild b/l wheezing, chronic  Abdominal:      General: Abdomen is flat. Bowel sounds are normal. There is no distension.      Palpations: Abdomen is soft.      Tenderness: There is no abdominal tenderness. There is no guarding or rebound.   Neurological:      Mental Status: She is alert and oriented to person, place, and time. Mental status is at baseline.      Cranial Nerves: No cranial nerve deficit.      Motor: No weakness.        Assessment and Plan     Assessment/Plan    The following medical issues were discussed during this encounter :     Mary Ann was seen today for hospital follow-up.  Diagnoses and all orders for this visit:    Chronic obstructive pulmonary disease with acute exacerbation (Multi) (Primary)  Follow-up after patient was hospitalized for COPD exacerbation approximately 1 week prior.  Patient reports improvement of symptoms. She has completed her course of antibiotics and has returned to her baseline home oxygen usage  Follow-up with outpatient pulmonology. Referral placed  Complete course of prednisone taper.   Will order Chest CT during her next visit, to undergo prior to her appointment with pulmonology  Continue home Breo inhaler and albuterol as needed. Refilled necessary medication.  -     ipratropium-albuteroL (Duo-Neb) 0.5-2.5 mg/3 mL nebulizer solution; Take 3 mL by nebulization every 6 hours if needed for shortness of breath.  -     fluticasone  furoate-vilanteroL (Breo Ellipta) 100-25 mcg/dose inhaler; Inhale 1 puff once daily.  -     Referral to Pulmonology; Future    Osteoporosis without current Fracture  Required steroid taper after recent hospitalization, 3 days remaining currently  Continue alendronate weekly as instructed         Atrial fibrillation, unspecified type (Multi)  Continue home metoprolol and Elliquis  Well-controlled, denies palpitations     Borderline hyperlipidemia  The 10-year ASCVD risk score (Marry DK, et al., 2019) is: 20%    Values used to calculate the score:      Age: 78 years      Sex: Female      Is Non- : Yes      Diabetic: Yes      Tobacco smoker: No      Systolic Blood Pressure: 115 mmHg      Is BP treated: Yes      HDL Cholesterol: 43.7 mg/dL      Total Cholesterol: 135 mg/dL    Continue home atorvastatin 20 mg  Most recent lipid panel was unremarkable  Essential hypertension  Well controlled. BP in office of 115/79  Continue with current medications.  Check BP at home daily.  Report to us if the numbers are higher than 130/80.     Prediabetes   A1C of 5.8, not currently on diabetes medications  Will recheck A1c during next visit.        Health maintenance  The following screening tests were ordered:  A1c during next visit    Immunizations: UTD     Please return in 6 weeks or if symptoms worsen     Alek uHnt, DO  Internal Medicine, PGY- 1  10/04/24 at 10:05 AM

## 2024-10-09 DIAGNOSIS — I10 HYPERTENSION, UNSPECIFIED TYPE: ICD-10-CM

## 2024-10-09 RX ORDER — METOPROLOL TARTRATE 25 MG/1
25 TABLET, FILM COATED ORAL 2 TIMES DAILY
Qty: 180 TABLET | Refills: 0 | Status: SHIPPED | OUTPATIENT
Start: 2024-10-09

## 2024-10-22 ENCOUNTER — OFFICE VISIT (OUTPATIENT)
Dept: ORTHOPEDIC SURGERY | Facility: CLINIC | Age: 79
End: 2024-10-22
Payer: MEDICARE

## 2024-10-22 ENCOUNTER — HOSPITAL ENCOUNTER (OUTPATIENT)
Dept: RADIOLOGY | Facility: CLINIC | Age: 79
Discharge: HOME | End: 2024-10-22
Payer: MEDICARE

## 2024-10-22 DIAGNOSIS — M25.562 LEFT KNEE PAIN, UNSPECIFIED CHRONICITY: ICD-10-CM

## 2024-10-22 DIAGNOSIS — M17.0 BILATERAL PRIMARY OSTEOARTHRITIS OF KNEE: Primary | ICD-10-CM

## 2024-10-22 PROCEDURE — 2500000004 HC RX 250 GENERAL PHARMACY W/ HCPCS (ALT 636 FOR OP/ED): Performed by: STUDENT IN AN ORGANIZED HEALTH CARE EDUCATION/TRAINING PROGRAM

## 2024-10-22 PROCEDURE — 1159F MED LIST DOCD IN RCRD: CPT | Performed by: STUDENT IN AN ORGANIZED HEALTH CARE EDUCATION/TRAINING PROGRAM

## 2024-10-22 PROCEDURE — 73564 X-RAY EXAM KNEE 4 OR MORE: CPT | Mod: RIGHT SIDE | Performed by: STUDENT IN AN ORGANIZED HEALTH CARE EDUCATION/TRAINING PROGRAM

## 2024-10-22 PROCEDURE — 1111F DSCHRG MED/CURRENT MED MERGE: CPT | Performed by: STUDENT IN AN ORGANIZED HEALTH CARE EDUCATION/TRAINING PROGRAM

## 2024-10-22 PROCEDURE — 99214 OFFICE O/P EST MOD 30 MIN: CPT | Performed by: STUDENT IN AN ORGANIZED HEALTH CARE EDUCATION/TRAINING PROGRAM

## 2024-10-22 PROCEDURE — 73564 X-RAY EXAM KNEE 4 OR MORE: CPT | Mod: LT

## 2024-10-22 PROCEDURE — 73564 X-RAY EXAM KNEE 4 OR MORE: CPT | Mod: RT

## 2024-10-22 PROCEDURE — 20610 DRAIN/INJ JOINT/BURSA W/O US: CPT | Mod: 50 | Performed by: STUDENT IN AN ORGANIZED HEALTH CARE EDUCATION/TRAINING PROGRAM

## 2024-10-22 NOTE — PROGRESS NOTES
PRIMARY CARE PHYSICIAN: Coretta Silver MD  REFERRING PROVIDER: No referring provider defined for this encounter.       SUBJECTIVE  CHIEF COMPLAINT:   Chief Complaint   Patient presents with    Left Knee - New Patient Visit, Pain        HPI: Mary Ann Forde is a pleasant 78 y.o. year-old female who is seen today for follow-up evaluation of bilateral knee pain.  I last saw the patient in November 2021.  At that time, administered an intra-articular injection to her left knee.  Intra-articular injection provided her with some relief for several months but she returns today for worsening knee pain.    The left knee continues to be more symptomatic than the right.  In her home, the patient is able to ambulate mostly without assistive devices but does  onto furniture as she walks for stability.  At the house, she does use a walker or wheelchair as needed.  She does complain of instability in her knees.  She has significant pain which is refractory to most medications.  She does use topical medications as well as Tylenol.    The patient does have a significant medical history.  She does have a history of COPD on home oxygen somewhere between 3-4 liters at any given time, pulmonary hypertension, atrial fibrillation on Eliquis and obesity.  The patient did a wonderful job of losing weight    REVIEW OF SYSTEMS  There has been no interval change in this patient's past medical, surgical, medications, allergies, family history or social history since the most recent visit to a provider within our department.  14 point review of systems was performed, reviewed, and negative except for pertinent positives documented in the history of present illness.    Past Medical History:   Diagnosis Date    Body mass index (BMI) 37.0-37.9, adult 02/13/2019    BMI 37.0-37.9, adult    Body mass index (BMI) 38.0-38.9, adult 04/05/2022    BMI 38.0-38.9,adult    Drug-induced obesity 04/05/2022    Class 2 drug-induced obesity with body  mass index (BMI) of 38.0 to 38.9 in adult, unspecified whether serious comorbidity present    Morbid (severe) obesity due to excess calories (Multi) 2022    Severe obesity (BMI 35.0-39.9) with comorbidity    Personal history of other specified conditions 2020    History of bacteremia        No Known Allergies     Past Surgical History:   Procedure Laterality Date    HYSTERECTOMY  2013    Hysterectomy        No family history on file.     Social History     Socioeconomic History    Marital status: Single     Spouse name: Not on file    Number of children: Not on file    Years of education: Not on file    Highest education level: Not on file   Occupational History    Not on file   Tobacco Use    Smoking status: Former     Current packs/day: 0.00     Average packs/day: 1 pack/day for 20.0 years (20.0 ttl pk-yrs)     Types: Cigarettes     Start date:      Quit date:      Years since quittin.8     Passive exposure: Past    Smokeless tobacco: Never   Substance and Sexual Activity    Alcohol use: Yes     Comment: socially    Drug use: Never    Sexual activity: Not on file   Other Topics Concern    Not on file   Social History Narrative    Not on file     Social Drivers of Health     Financial Resource Strain: Low Risk  (2024)    Overall Financial Resource Strain (CARDIA)     Difficulty of Paying Living Expenses: Not very hard   Food Insecurity: Not on file   Transportation Needs: No Transportation Needs (2024)    PRAPARE - Transportation     Lack of Transportation (Medical): No     Lack of Transportation (Non-Medical): No   Physical Activity: Not on file   Stress: Not on file   Social Connections: Not on file   Intimate Partner Violence: Not on file   Housing Stability: Low Risk  (2024)    Housing Stability Vital Sign     Unable to Pay for Housing in the Last Year: No     Number of Times Moved in the Last Year: 1     Homeless in the Last Year: No        CURRENT MEDICATIONS:    Current Outpatient Medications   Medication Sig Dispense Refill    albuterol 90 mcg/actuation inhaler INHALE 1 PUFF BY MOUTH EVERY 4 HOURS AS NEEDED (Patient taking differently: Inhale 1 puff every 4 hours if needed for wheezing or shortness of breath. INHALE 1 PUFF BY MOUTH EVERY 4 HOURS AS NEEDED) 18 g 2    alendronate (Fosamax) 70 mg tablet Take 1 tablet (70 mg) by mouth every 7 days. Take in the morning with a full glass of water, on an empty stomach, and do not take anything else by mouth or lie down for the next 30 min. (Patient taking differently: Take 1 tablet (70 mg) by mouth every 7 days. Take in the morning with a full glass of water, on an empty stomach, and do not take anything else by mouth or lie down for the next 30 min. Thurs) 4 tablet 11    amLODIPine (Norvasc) 10 mg tablet Take 0.5 tablets (5 mg) by mouth once daily. for blood pressure      apixaban (Eliquis) 5 mg tablet Take 1 tablet (5 mg) by mouth 2 times a day. 180 tablet 0    atorvastatin (Lipitor) 20 mg tablet Take 1 tablet (20 mg) by mouth once daily in the evening. 90 tablet 0    calcium carbonate (Tums) 200 mg calcium chewable tablet Chew 3 tablets (1,500 mg) once daily. 90 tablet 11    cholecalciferol (Vitamin D3) 50 MCG (2000 UT) tablet Take 1 tablet (50 mcg) by mouth once daily.      dorzolamide (Trusopt) 2 % ophthalmic solution Administer 1 drop into the left eye 3 times a day. (Patient not taking: Reported on 9/24/2024) 30 mL 1    fluticasone furoate-vilanteroL (Breo Ellipta) 100-25 mcg/dose inhaler Inhale 1 puff once daily. 60 each 2    ipratropium-albuteroL (Duo-Neb) 0.5-2.5 mg/3 mL nebulizer solution Take 3 mL by nebulization every 6 hours if needed for shortness of breath. 180 mL 1    magnesium oxide (Mag-Ox) 400 mg (241.3 mg magnesium) tablet Take 1 tablet (400 mg) by mouth once daily.      metoprolol tartrate (Lopressor) 25 mg tablet TAKE 1 TABLET BY MOUTH TWICE A  tablet 0    netarsudiL-latanoprost (Rocklatan)  0.02-0.005 % drops Administer 1 drop into the right eye once daily at bedtime.      prednisoLONE acetate (Pred-Forte) 1 % ophthalmic suspension Administer 1 drop into the left eye 2 times a day.      timolol (Timoptic) 0.5 % ophthalmic solution Administer 1 drop into both eyes once daily. (Patient taking differently: Administer 1 drop into the left eye once daily.) 5 mL 0     No current facility-administered medications for this visit.        OBJECTIVE    PHYSICAL EXAM  There is no height or weight on file to calculate BMI.    General: Well-appearing female in no acute distress.  Awake, alert and oriented.  Pleasant and cooperative.  Respiratory: Non-labored breathing  Mood: Euthymic   Gait: Antalgic    Affected Left Knee  Limb Alignment: Severe valgus  ROM: 0-100  Stable to varus and valgus stress at full extension and 30 degrees of flexion  Deformity does not fully correct  Skin: Intact, no abrasions or draining sinuses  Effusion: None  Quad Strength: 5/5  Hamstring Strength: 5/5  Patella Crepitus: None  Tenderness: Lateral joint line  Sensation: Intact to light touch distally  Motor function: Able to fire TA, EHL, G/S  Pulses: Palpable DP pulse     Unaffected Right Knee  Skin: Intact  ROM: 0-110  Effusion: None  No tenderness to palpation on exam    IMAGING:  AP / lateral/ mid-flexion/sunrise views: Independent review of left knee x-rays was performed. The findings were reviewed with the patient. There are severe degenerative changes of the left knee with valgus limb alignment. There is joint space narrowing, subchondral sclerosis, and osteophyte formation. No evidence of fracture, AVN, dislocation, osteomyelitis.      AP / lateral/ mid-flexion/sunrise views: Independent review of right knee x-rays was performed. The findings were reviewed with the patient. There are severe degenerative changes of the right knee with varus limb alignment. There is joint space narrowing, subchondral sclerosis, and osteophyte  formation. No evidence of fracture, AVN, dislocation, osteomyelitis.      ASSESSMENT & PLAN    IMPRESSION:  Mary Ann Forde is a 78 y.o. female with end-stage osteoarthritis of both knees.  The patient has done wonderful job of losing weight.  However, her medical comorbidities place her at high risk of perioperative and postoperative complication if we were to move forward with total joint replacement.  Specifically, her pulmonary hypertension and oxygen dependence preoperatively are concerning.  I discussed these concerns with the patient.  On the other hand, the patient does have advanced osteoarthritis of the left knee with a progressive valgus deformity that would likely get worse.    PLAN:  At this time, the patient would like to continue with conservative treatment.  I think it is reasonable.  Will repeat the injections today.  I will see the patient back in 3 months without radiographs to reassess her symptoms and response to the intra-articular injections.    Patient ID: Mary Ann Forde is a 78 y.o. female.    L Inj/Asp: bilateral knee on 10/22/2024 5:00 PM  Indications: pain and joint swelling  Details: 22 G needle, anterolateral approach  Medications (Right): 1 mL triamcinolone acetonide 40 mg/mL; 4 mL lidocaine 10 mg/mL (1 %)  Medications (Left): 1 mL triamcinolone acetonide 40 mg/mL; 4 mL lidocaine 10 mg/mL (1 %)  Outcome: tolerated well, no immediate complications    Discussion:  I discussed the conservative treatment options for knee osteoarthritis including but not limited to physical therapy, oral NSAIDS, activity and lifestyle modification, and corticosteroid injections. Pt has elected to undergo a cortisone injection today. I have explained the risk and benefits of an injection including the possibility of joint infection, bleeding, damage to cartilage, allergic reaction. Patient verbalized understanding and gave verbal consent wishes to proceed with a intra-articular cortisone injection for their  knee.    Procedure:  After discussing the risk and benefits of the procedure, we proceeded with an intra-articular bilateral knee injection.    With the patient's informed verbal consent, the bilateral knees were prepped in standard sterile fashion with Chlorhexidine. The skin was then anesthetized with ethyl chloride spray and cleaned again with Chlorhexidine. The bilateral knees were then apirated/injected with a prefilled 20-gauge syringe of 40 mg Kenalog + 4 ml Lidocaine using the lateral approach without complications.  The patient tolerated this well and felt immediate initial relief of symptoms. A bandaid was applied and the patient ambulated out of the clinic on ther own accord without difficulty. Patient was instructed to avoid physical activity for 24-48 hours to prevent the knees from swelling and may ice the knees as tolerated. Patient should contact the office if any signs of of infection appear: redness, fever, chills, drainage, swelling or warmth to the knees.  Pt understands that the injections can be repeated no sooner than 3 months.      Procedure, treatment alternatives, risks and benefits explained, specific risks discussed. Consent was given by the patient. Immediately prior to procedure a time out was called to verify the correct patient, procedure, equipment, support staff and site/side marked as required. Patient was prepped and draped in the usual sterile fashion.           *This note was created using voice recognition software and was not corrected for typographical or grammatical errors.*

## 2024-10-22 NOTE — PROGRESS NOTES
"      NAI Leija, ROSAURA  Division of Adult Reconstruction  Phone: [phone]  Fax: [phone]    PRIMARY CARE PHYSICIAN: Coretta Silver MD  REFERRING PROVIDER: No referring provider defined for this encounter.         DANNY Forde is a pleasant 78 y.o. year-old female who is seen today for evaluation of left knee pain. The pain has been present for {Number:64396}{DAYS, WEEKS, MONTHS, YEARS:22024}. {HE/SHE:04957} rates the pain a {PAIN SCALE NUMBERS:93416} out of 10. They {DO/DO NOT:57034} associate with a traumatic injury. The patient states that the pain is located in the {MEDIAL/LAT/ANT/POST DETAILED:60989}. The pain is aggravated by {KWAGGRAVATING FACTOR:82633}. The patient has tried {WCL Non Op Treatments:82248::\"over the counter medications\"} without sufficient relief of symptoms. The patient denies any history of inflammatory arthritis. The patient denies any numbness or tingling of the {side:62494} lower extremity.      History of knee surgery: {total joint:52716}  Functional status: occasionally limited   Instability: {YES,NO:59365}  Symptoms impacting sleep: {YES,NO:81580}  Impacting quality of life: {YES,NO:64523}  Pain level: {PAIN SCALE NUMBERS:37232}  Back pain: {YES (DEF)/NO:14703}  Hip/groin pain: {YES (DEF)/NO:62536}  Radicular symptoms: {kwside:53564}      Review of systems  There has been no interval change in this patient's past medical, surgical, medications, allergies, family history or social history since the most recent visit to a provider within our department. Adult patient history sheet was filled out by the patient today in clinic and will be scanned into the EMR. I personally reviewed this form which will be scanned into the EMR. 14 point review of systems was performed, reviewed, and negative except for pertinent positives documented in the history of present illness.    Past Medical History:   Diagnosis Date    Body mass index (BMI) 37.0-37.9, adult " 02/13/2019    BMI 37.0-37.9, adult    Body mass index (BMI) 38.0-38.9, adult 04/05/2022    BMI 38.0-38.9,adult    Drug-induced obesity 04/05/2022    Class 2 drug-induced obesity with body mass index (BMI) of 38.0 to 38.9 in adult, unspecified whether serious comorbidity present    Morbid (severe) obesity due to excess calories (Multi) 01/11/2022    Severe obesity (BMI 35.0-39.9) with comorbidity    Personal history of other specified conditions 06/19/2020    History of bacteremia     Patient Active Problem List   Diagnosis    Abnormal mammogram    Arthritis    Atrial fibrillation (Multi)    Bacteremia    Borderline hyperlipidemia    Breast lump    Chronic obstructive pulmonary disease (Multi)    Difficulty breathing    BOOTHE (dyspnea on exertion)    Elevated liver enzymes    Essential hypertension    Familial hypercholesteremia    History of adenomatous polyp of colon    Hypomagnesemia    Hypotension    Impaired fasting glucose    Knee pain    Obesity    Osteoporosis    Primary osteoarthritis of left knee    Snoring    Chronic obstructive pulmonary disease, unspecified    Hypertension    Diabetes mellitus due to underlying condition with diabetic cataract    Acute dyspnea    Hypoxia    COPD exacerbation (Multi)    Shortness of breath    Prediabetes    Flu vaccine need     Medication Documentation Review Audit       Reviewed by Carmelina Johnson MA (Medical Assistant) on 10/22/24 at 1501      Medication Order Taking? Sig Documenting Provider Last Dose Status   albuterol 90 mcg/actuation inhaler [id number]  INHALE 1 PUFF BY MOUTH EVERY 4 HOURS AS NEEDED   Patient taking differently: Inhale 1 puff every 4 hours if needed for wheezing or shortness of breath. INHALE 1 PUFF BY MOUTH EVERY 4 HOURS AS NEEDED    Coretta Silver MD  Active   alendronate (Fosamax) 70 mg tablet [id number] No Take 1 tablet (70 mg) by mouth every 7 days. Take in the morning with a full glass of water, on an empty stomach, and do not take anything  else by mouth or lie down for the next 30 min.   Patient taking differently: Take 1 tablet (70 mg) by mouth every 7 days. Take in the morning with a full glass of water, on an empty stomach, and do not take anything else by mouth or lie down for the next 30 min. Neva Robison MD 9/19/2024 Active   amLODIPine (Norvasc) 10 mg tablet [id number]  Take 0.5 tablets (5 mg) by mouth once daily. for blood pressure Melissa Albrecht MD  Active   apixaban (Eliquis) 5 mg tablet [id number] No Take 1 tablet (5 mg) by mouth 2 times a day. Nam Robison MD 9/24/2024 Active   atorvastatin (Lipitor) 20 mg tablet [id number] No Take 1 tablet (20 mg) by mouth once daily in the evening. Nam Robison MD 9/23/2024 Active   calcium carbonate (Tums) 200 mg calcium chewable tablet [id number] No Chew 3 tablets (1,500 mg) once daily. Nam Robison MD 9/24/2024 Active   cholecalciferol (Vitamin D3) 50 MCG (2000 UT) tablet [id number] No Take 1 tablet (50 mcg) by mouth once daily. Historical Provider, MD 9/24/2024 Active   dorzolamide (Trusopt) 2 % ophthalmic solution [id number] No Administer 1 drop into the left eye 3 times a day.   Patient not taking: Reported on 9/24/2024    Nam Robison MD Not Taking Active   fluticasone furoate-vilanteroL (Breo Ellipta) 100-25 mcg/dose inhaler [id number]  Inhale 1 puff once daily. Alek Hunt DO  Active   ipratropium-albuteroL (Duo-Neb) 0.5-2.5 mg/3 mL nebulizer solution [id number]  Take 3 mL by nebulization every 6 hours if needed for shortness of breath. Alek Hunt DO  Active   magnesium oxide (Mag-Ox) 400 mg (241.3 mg magnesium) tablet [id number] No Take 1 tablet (400 mg) by mouth once daily. Historical Provider, MD 9/24/2024 Active   metoprolol tartrate (Lopressor) 25 mg tablet [id number]  TAKE 1 TABLET BY MOUTH TWICE A DAY Coretta Silver MD  Active   netarsudiL-latanoprost (Rocklatan) 0.02-0.005 % drops [id number] No Administer 1 drop into the right eye once  daily at bedtime. Historical Provider, MD 2024 Active   prednisoLONE acetate (Pred-Forte) 1 % ophthalmic suspension [id number] No Administer 1 drop into the left eye 2 times a day. Historical Provider, MD 2024 Active   timolol (Timoptic) 0.5 % ophthalmic solution [id number] No Administer 1 drop into both eyes once daily.   Patient taking differently: Administer 1 drop into the left eye once daily.    Blossom Dyson, DO 2024  24 2350                  No Known Allergies  Social History     Socioeconomic History    Marital status: Single     Spouse name: Not on file    Number of children: Not on file    Years of education: Not on file    Highest education level: Not on file   Occupational History    Not on file   Tobacco Use    Smoking status: Former     Current packs/day: 0.00     Average packs/day: 1 pack/day for 20.0 years (20.0 ttl pk-yrs)     Types: Cigarettes     Start date:      Quit date:      Years since quittin.8     Passive exposure: Past    Smokeless tobacco: Never   Substance and Sexual Activity    Alcohol use: Yes     Comment: socially    Drug use: Never    Sexual activity: Not on file   Other Topics Concern    Not on file   Social History Narrative    Not on file     Social Drivers of Health     Financial Resource Strain: Low Risk  (2024)    Overall Financial Resource Strain (CARDIA)     Difficulty of Paying Living Expenses: Not very hard   Food Insecurity: Not on file   Transportation Needs: No Transportation Needs (2024)    PRAPARE - Transportation     Lack of Transportation (Medical): No     Lack of Transportation (Non-Medical): No   Physical Activity: Not on file   Stress: Not on file   Social Connections: Not on file   Intimate Partner Violence: Not on file   Housing Stability: Low Risk  (2024)    Housing Stability Vital Sign     Unable to Pay for Housing in the Last Year: No     Number of Times Moved in the Last Year: 1     Homeless in the Last  Year: No     Past Surgical History:   Procedure Laterality Date    HYSTERECTOMY  07/29/2013    Hysterectomy         Physical Exam  General: Well-appearing female in no acute distress. Awake, alert and oriented. Pleasant and cooperative.  Respiratory: Non-labored breathing  Mood: Euthymic   Gait: {kwgait:09671}  Assistive Device: {amb assistive device:99903}  Skin: warm, dry and intact without lesions    Affected {side:16376} knee:  Tenderness to palpation over the {MEDIAL/LAT/ANT/POST DETAILED:86846} joint line.  Effusion: {severity:51360}  ROM: {kwkneeextension:24170} - {degrees of flexion to 180:92810}  Limb Alignment: {Neutral/varus:03898}  Stable to varus and valgus stress at full extension and 30 degrees of flexion  Quad Strength: 5/5  Hamstring Strength: 5/5  Patella Crepitus: {YES,NO:36515}  Patella Grind Test: {YES,NO:04506}  Sensation: Intact to light touch distally  Motor function: Able to fire TA, EHL, G/S  Pulses: Palpable DP pulse    Unaffected {side:93546} knee:  Skin: Intact  ROM: {kwkneeextension:45985} - {degrees of flexion to 180:97007}  Effusion: {severity:10435}  No tenderness to palpation on exam    Imaging  Imaging-4 view xrays of the {Right/left:44994} knee were independently reviewed and interpreted in clinic today. The findings were reviewed with the patient. There are {AMB MILD/MODERATE/SEVERE:38771} degenerative changes of the {Right/left:44836} knee with {VARUS:34973} limb alignment. There is {kwxrhip/knee:69783} {medial/lateral:35632} joint space narrowing with underlying subchondral sclerosis, and osteophyte formation. No evidence of fracture, AVN, dislocation, osteomyelitis.         Impression/Plan  Mary Ann Forde is a 78 y.o. year-old female with {MILD, MODERATE, SEVERE:39300} OA of the {side:03019} knee. I discussed the etiology of symptoms. We discussed in detail a treatment plan that {HE/SHE:26598} is comfortable with.    {kwxrhip:51318} Osteoarthritis {side:20304} knee. We  reviewed an evidence-based approach to osteoarthritis of the knee.    I had an in-depth discussion about the nature and natural history of degenerative joint disease. I discussed the etiology of symptoms. I explained that it is progressive, but there is no way to predict how quickly a patient's symptoms will get worse. I discussed non-operative treatments for the patient's osteoarthritis in detail and briefly discussed indications for surgery. I advised that the patient that they can manage their pain symptomatically, with 3-5 day courses of nonsteroidal anti-inflammatories discussed their risks and need for regular monitoring for side effects of these medications. I suggested activity modification as needed when there is a a flare up. I explained to them that the best interventions {he/she/they:50661} can take to perhaps slow the progression of the degenerative changes in the long-term are maintaining a healthy weight and avoiding joint loading activities and perform low impact exercise such as cycling, walking, and swimming. The use of a walking stick, cane or other assistive device can help as well.      I also discussed more invasive treatment options including injections and radiofrequency nerve ablation. I talked about the risks and benefits of injections, focusing on the fact that there is no way to predict the degree or duration of symptom relief, but that it can provide weeks to months of pain relief in most patients. Should these measures fail, the most invasive option would be joint replacement surgery. The patient should try and delay joint replacement surgery for as long as possible. If the patients' joint problem affects their quality of life and cause significant restrictions of their activities, they may want to consider joint replacement surgery. I briefly covered the risks, benefits and expected recovery after total joint arthroplasty.    Mary Ann Forde may one day benefit from an elective total  joint replacement however has not yet exhausted other treatment options. I do not see a clear indication to move forward with arthroplasty.   I have recommended the following and the patient is in agreement with the plan.  1. ***    All of the patient's questions were answered and {he/she/they:08046} was comfortable with this plan of care.  Mary Ann WILLIAN Forde was encouraged to call if any problems, questions or concerns arise.     Follow-up {kwtime:35049}    NIA Lazaro, PAAnyiC  Orthopedic Physician Assisant  Division of Adult Reconstruction  Department of Orthopaedics  [place] [address]  Phone: [phone]  Fax: [phone]

## 2024-10-26 RX ORDER — TRIAMCINOLONE ACETONIDE 40 MG/ML
1 INJECTION, SUSPENSION INTRA-ARTICULAR; INTRAMUSCULAR
Status: COMPLETED | OUTPATIENT
Start: 2024-10-22 | End: 2024-10-22

## 2024-10-26 RX ORDER — LIDOCAINE HYDROCHLORIDE 10 MG/ML
4 INJECTION, SOLUTION INFILTRATION; PERINEURAL
Status: COMPLETED | OUTPATIENT
Start: 2024-10-22 | End: 2024-10-22

## 2024-11-19 ENCOUNTER — APPOINTMENT (OUTPATIENT)
Dept: PRIMARY CARE | Facility: CLINIC | Age: 79
End: 2024-11-19
Payer: MEDICARE

## 2024-11-21 DIAGNOSIS — Z00.00 ROUTINE HEALTH MAINTENANCE: ICD-10-CM

## 2024-11-21 RX ORDER — TIMOLOL MALEATE 5 MG/ML
1 SOLUTION/ DROPS OPHTHALMIC DAILY
Qty: 3 ML | Refills: 0 | Status: SHIPPED | OUTPATIENT
Start: 2024-11-21 | End: 2024-12-21

## 2024-11-29 ENCOUNTER — APPOINTMENT (OUTPATIENT)
Dept: PULMONOLOGY | Facility: CLINIC | Age: 79
End: 2024-11-29
Payer: MEDICARE

## 2024-12-13 ENCOUNTER — APPOINTMENT (OUTPATIENT)
Dept: PRIMARY CARE | Facility: CLINIC | Age: 79
End: 2024-12-13
Payer: MEDICARE

## 2024-12-13 DIAGNOSIS — E78.01 FAMILIAL HYPERCHOLESTEREMIA: ICD-10-CM

## 2024-12-13 DIAGNOSIS — I10 HYPERTENSION, UNSPECIFIED TYPE: ICD-10-CM

## 2024-12-13 RX ORDER — ATORVASTATIN CALCIUM 20 MG/1
20 TABLET, FILM COATED ORAL EVERY EVENING
Qty: 90 TABLET | Refills: 0 | Status: SHIPPED | OUTPATIENT
Start: 2024-12-13

## 2024-12-13 RX ORDER — METOPROLOL TARTRATE 25 MG/1
25 TABLET, FILM COATED ORAL 2 TIMES DAILY
Qty: 180 TABLET | Refills: 0 | Status: SHIPPED | OUTPATIENT
Start: 2024-12-13

## 2024-12-18 ENCOUNTER — APPOINTMENT (OUTPATIENT)
Dept: PRIMARY CARE | Facility: CLINIC | Age: 79
End: 2024-12-18
Payer: MEDICARE

## 2024-12-18 VITALS
DIASTOLIC BLOOD PRESSURE: 79 MMHG | SYSTOLIC BLOOD PRESSURE: 121 MMHG | WEIGHT: 245 LBS | BODY MASS INDEX: 36.29 KG/M2 | HEART RATE: 99 BPM | HEIGHT: 69 IN

## 2024-12-18 DIAGNOSIS — E78.5 BORDERLINE HYPERLIPIDEMIA: ICD-10-CM

## 2024-12-18 DIAGNOSIS — J44.9 CHRONIC OBSTRUCTIVE PULMONARY DISEASE, UNSPECIFIED COPD TYPE (MULTI): Primary | ICD-10-CM

## 2024-12-18 DIAGNOSIS — I48.91 ATRIAL FIBRILLATION, UNSPECIFIED TYPE (MULTI): ICD-10-CM

## 2024-12-18 DIAGNOSIS — I10 ESSENTIAL HYPERTENSION: ICD-10-CM

## 2024-12-18 PROCEDURE — G2211 COMPLEX E/M VISIT ADD ON: HCPCS | Performed by: INTERNAL MEDICINE

## 2024-12-18 PROCEDURE — 1036F TOBACCO NON-USER: CPT | Performed by: INTERNAL MEDICINE

## 2024-12-18 PROCEDURE — 3074F SYST BP LT 130 MM HG: CPT | Performed by: INTERNAL MEDICINE

## 2024-12-18 PROCEDURE — 3078F DIAST BP <80 MM HG: CPT | Performed by: INTERNAL MEDICINE

## 2024-12-18 PROCEDURE — 99214 OFFICE O/P EST MOD 30 MIN: CPT | Performed by: INTERNAL MEDICINE

## 2024-12-18 ASSESSMENT — PATIENT HEALTH QUESTIONNAIRE - PHQ9
2. FEELING DOWN, DEPRESSED OR HOPELESS: NOT AT ALL
1. LITTLE INTEREST OR PLEASURE IN DOING THINGS: NOT AT ALL
SUM OF ALL RESPONSES TO PHQ9 QUESTIONS 1 AND 2: 0

## 2024-12-18 NOTE — PROGRESS NOTES
"Subjective   Patient ID: Mary Ann Forde is a 78 y.o. female who presents for Follow-up (Refills on all meds).    HPI     Mary Ann Forde is a 78 y.o. year old female patient with a PMH significant for hypertension, pulm htn, HFpEF 80% hyperlipidemia, COPD intermittently on 3-4 L NC at home, T2DM osteoporosis, obstructive sleep apnea on CPAP, afib on Eliquis anticoagulation presents to the clinic for follow-up.  Denied any complaints today some discomfort in her left ear. no pain, redness, swelling or discharge from left ear.  As for KEVIN, she does not use CPAP at night and for some unknown reason her sleep test was discontinued last time.  She had his pulmonary visit next month.  Up-to-date with her vaccinations except RSV.     Review of Systems   HENT:          Uncomfortable feeling in left ear , denied any pain, swelling or discharge   All other systems reviewed and are negative.      Objective   /79 (BP Location: Right arm, Patient Position: Sitting, BP Cuff Size: Large adult)   Pulse 99   Ht 1.753 m (5' 9\")   Wt 111 kg (245 lb)   BMI 36.18 kg/m²     Physical Exam  Constitutional:       Appearance: She is obese.   Cardiovascular:      Rate and Rhythm: Normal rate and regular rhythm.      Pulses: Normal pulses.      Heart sounds: Normal heart sounds.   Pulmonary:      Effort: Pulmonary effort is normal.      Breath sounds: No rhonchi.      Comments: BS barely audible  Abdominal:      Palpations: Abdomen is soft.   Neurological:      Mental Status: She is alert.         Assessment/Plan     Mary Ann Forde is a 78 y.o. year old female patient with a PMH significant for hypertension, pulm htn, HFpEF 80% hyperlipidemia, COPD intermittently on 3-4 L NC at home, T2DM osteoporosis, KEVIN(not on CPAP), afib on Eliquis anticoagulation presents to the clinic for follow-up      # Chronic obstructive pulmonary disease   -  patient was hospitalized(9/24/24) for COPD exacerbation, had a f/u in our office approximately 1 " week after.   -Currently on her home baseline oxygen.   - DC recomended Follow-up with outpatient pulmonology. Referral placed  - Pt can't use Trilogy for Glaucoma   - Continue home Breo inhaler and albuterol as needed. Refilled necessary medication.  -ipratropium-albuteroL (Duo-Neb) 0.5-2.5 mg/3 mL nebulizer solution; Take 3 mL by nebulization every 6 hours if needed for shortness of breath.  -fluticasone furoate-vilanteroL (Breo Ellipta) 100-25 mcg/dose inhaler; Inhale 1 puff once daily.    # KEVIN(not on CPAP)  - Recommended discuss with her pulmonologist regarding Sleep test as well as CPAP.     # Osteoporosis without current Fracture  - Continue alendronate weekly as instructed          # Atrial fibrillation, unsprecified type  - Well-controlled, denies palpitations   - Continue home metoprolol and Elliquis       # Borderline hyperlipidemia  - The 10-year ASCVD risk score (Marry DK, et al., 2019) is: 21.4%    Values used to calculate the score:      Age: 78 years      Sex: Female      Is Non- : Yes      Diabetic: Yes      Tobacco smoker: No      Systolic Blood Pressure: 121 mmHg      Is BP treated: Yes      HDL Cholesterol: 43.7 mg/dL      Total Cholesterol: 135 mg/dL     - Continue home atorvastatin 20 mg  - Most recent lipid panel was unremarkable    # Essential hypertension  - Echo(9/2024): Left ventricular ejection fraction is hyperdynamic, by visual estimate at 75-80% with an abnormal pattern of left ventricular diastolic filling.  Left ventricular cavity size is decreased.Normal RV function.   left atrium is severely dilated. mild to moderate TR, Eevated RVSP   - Well controlled. BP in office of 121/79  - Continue with current medications.  - Check BP at home daily.  - Report to us if the numbers are higher than 130/80.      # Prediabetes  -  A1C of 5.8, not currently on diabetes medications  Will recheck A1c during next visit.    # end-stage osteoarthritis of both knees.  - had a  ortho visit(11/20/24)  - recommended  continue with conservative treatment.  - got steroid shots in bl knees with 3 months f/u           # Health maintenance  - Immunizations: UTD , will take RSV in her pharmacy  -RTC in 3 months.      Problem List Items Addressed This Visit          Pulmonary and Pneumonias    Chronic obstructive pulmonary disease (Multi) - Primary

## 2024-12-30 DIAGNOSIS — I10 ESSENTIAL HYPERTENSION: ICD-10-CM

## 2024-12-30 RX ORDER — AMLODIPINE BESYLATE 10 MG/1
10 TABLET ORAL DAILY
Qty: 90 TABLET | Refills: 0 | Status: SHIPPED | OUTPATIENT
Start: 2024-12-30

## 2025-01-10 DIAGNOSIS — E78.01 FAMILIAL HYPERCHOLESTEREMIA: ICD-10-CM

## 2025-01-10 RX ORDER — ATORVASTATIN CALCIUM 20 MG/1
20 TABLET, FILM COATED ORAL EVERY EVENING
Qty: 90 TABLET | Refills: 0 | Status: SHIPPED | OUTPATIENT
Start: 2025-01-10

## 2025-01-14 ENCOUNTER — APPOINTMENT (OUTPATIENT)
Dept: PULMONOLOGY | Facility: CLINIC | Age: 80
End: 2025-01-14
Payer: MEDICARE

## 2025-01-20 DIAGNOSIS — I48.91 ATRIAL FIBRILLATION, UNSPECIFIED TYPE (MULTI): ICD-10-CM

## 2025-01-20 RX ORDER — APIXABAN 5 MG/1
5 TABLET, FILM COATED ORAL 2 TIMES DAILY
Qty: 180 TABLET | Refills: 0 | Status: SHIPPED | OUTPATIENT
Start: 2025-01-20

## 2025-01-21 ENCOUNTER — APPOINTMENT (OUTPATIENT)
Dept: PULMONOLOGY | Facility: CLINIC | Age: 80
End: 2025-01-21
Payer: MEDICARE

## 2025-02-18 ENCOUNTER — APPOINTMENT (OUTPATIENT)
Dept: ORTHOPEDIC SURGERY | Facility: CLINIC | Age: 80
End: 2025-02-18
Payer: MEDICARE

## 2025-02-22 DIAGNOSIS — J44.9 CHRONIC OBSTRUCTIVE PULMONARY DISEASE, UNSPECIFIED: ICD-10-CM

## 2025-02-23 RX ORDER — ALBUTEROL SULFATE 90 UG/1
1 INHALANT RESPIRATORY (INHALATION) EVERY 4 HOURS PRN
Qty: 18 G | Refills: 2 | Status: SHIPPED | OUTPATIENT
Start: 2025-02-23

## 2025-02-24 ENCOUNTER — APPOINTMENT (OUTPATIENT)
Dept: PULMONOLOGY | Facility: CLINIC | Age: 80
End: 2025-02-24
Payer: MEDICARE

## 2025-03-12 DIAGNOSIS — I10 HYPERTENSION, UNSPECIFIED TYPE: ICD-10-CM

## 2025-03-13 RX ORDER — METOPROLOL TARTRATE 25 MG/1
25 TABLET, FILM COATED ORAL 2 TIMES DAILY
Qty: 180 TABLET | Refills: 0 | Status: SHIPPED | OUTPATIENT
Start: 2025-03-13

## 2025-03-14 DIAGNOSIS — J44.9 CHRONIC OBSTRUCTIVE PULMONARY DISEASE, UNSPECIFIED: ICD-10-CM

## 2025-03-14 RX ORDER — FLUTICASONE FUROATE AND VILANTEROL 100; 25 UG/1; UG/1
1 POWDER RESPIRATORY (INHALATION) DAILY
Qty: 60 EACH | Refills: 2 | Status: SHIPPED | OUTPATIENT
Start: 2025-03-14

## 2025-03-18 DIAGNOSIS — I10 ESSENTIAL HYPERTENSION: ICD-10-CM

## 2025-03-18 DIAGNOSIS — M81.0 OSTEOPOROSIS, UNSPECIFIED OSTEOPOROSIS TYPE, UNSPECIFIED PATHOLOGICAL FRACTURE PRESENCE: ICD-10-CM

## 2025-03-18 DIAGNOSIS — E78.01 FAMILIAL HYPERCHOLESTEREMIA: ICD-10-CM

## 2025-03-18 RX ORDER — ATORVASTATIN CALCIUM 20 MG/1
20 TABLET, FILM COATED ORAL EVERY EVENING
Qty: 90 TABLET | Refills: 0 | Status: SHIPPED | OUTPATIENT
Start: 2025-03-18

## 2025-03-18 RX ORDER — AMLODIPINE BESYLATE 10 MG/1
10 TABLET ORAL DAILY
Qty: 90 TABLET | Refills: 0 | Status: SHIPPED | OUTPATIENT
Start: 2025-03-18

## 2025-03-19 RX ORDER — ALENDRONATE SODIUM 70 MG/1
70 TABLET ORAL
Qty: 12 TABLET | Refills: 0 | Status: SHIPPED | OUTPATIENT
Start: 2025-03-19 | End: 2026-03-19

## 2025-03-21 ENCOUNTER — APPOINTMENT (OUTPATIENT)
Dept: PRIMARY CARE | Facility: CLINIC | Age: 80
End: 2025-03-21
Payer: MEDICARE

## 2025-03-21 VITALS
SYSTOLIC BLOOD PRESSURE: 122 MMHG | WEIGHT: 245 LBS | BODY MASS INDEX: 36.29 KG/M2 | HEART RATE: 93 BPM | HEIGHT: 69 IN | DIASTOLIC BLOOD PRESSURE: 82 MMHG

## 2025-03-21 DIAGNOSIS — I48.91 ATRIAL FIBRILLATION, UNSPECIFIED TYPE (MULTI): ICD-10-CM

## 2025-03-21 DIAGNOSIS — F32.1 CURRENT MODERATE EPISODE OF MAJOR DEPRESSIVE DISORDER WITHOUT PRIOR EPISODE (MULTI): Primary | ICD-10-CM

## 2025-03-21 DIAGNOSIS — E55.9 HYPOVITAMINOSIS D: ICD-10-CM

## 2025-03-21 DIAGNOSIS — I10 ESSENTIAL HYPERTENSION: ICD-10-CM

## 2025-03-21 DIAGNOSIS — R73.9 HYPERGLYCEMIA: ICD-10-CM

## 2025-03-21 DIAGNOSIS — E78.5 BORDERLINE HYPERLIPIDEMIA: ICD-10-CM

## 2025-03-21 RX ORDER — PAROXETINE 10 MG/1
10 TABLET, FILM COATED ORAL EVERY EVENING
Qty: 30 TABLET | Refills: 1 | Status: SHIPPED | OUTPATIENT
Start: 2025-03-21 | End: 2025-05-20

## 2025-03-21 ASSESSMENT — ENCOUNTER SYMPTOMS
DIZZINESS: 0
FEVER: 0
DIFFICULTY URINATING: 0
SLEEP DISTURBANCE: 1
DECREASED CONCENTRATION: 0
CHEST TIGHTNESS: 0
FATIGUE: 1
PALPITATIONS: 0

## 2025-03-21 ASSESSMENT — PATIENT HEALTH QUESTIONNAIRE - PHQ9
SUM OF ALL RESPONSES TO PHQ9 QUESTIONS 1 AND 2: 2
1. LITTLE INTEREST OR PLEASURE IN DOING THINGS: SEVERAL DAYS
10. IF YOU CHECKED OFF ANY PROBLEMS, HOW DIFFICULT HAVE THESE PROBLEMS MADE IT FOR YOU TO DO YOUR WORK, TAKE CARE OF THINGS AT HOME, OR GET ALONG WITH OTHER PEOPLE: VERY DIFFICULT
2. FEELING DOWN, DEPRESSED OR HOPELESS: SEVERAL DAYS

## 2025-03-21 NOTE — PROGRESS NOTES
Primary Care Visit Note    Patient: Mary Ann Forde, Age: 79 y.o., SEX: female , MRN:[id number],   PCP: Coretta Silver MD        Resident:  Alek Hunt DO   Preferred Pharmacy:   [facility]/pharmacy #[id number] [place], [place] - [address] [address]  [address] [facility] [place] [address]  Phone: [phone] Fax: [phone]          Chief Complaint     Patient: Mary Ann Forde is a 79 y.o. female who presented to the clinic for   Chief Complaint   Patient presents with    Follow-up        Subjective      HPI    Mary Ann Forde is a 79 y.o. year old female patient with a PMH significant for hypertension, pulm htn, HFpEF 80% hyperlipidemia, COPD intermittently on 3-4 L NC at home, T2DM osteoporosis, obstructive sleep apnea on CPAP, afib on anticoagulation presents to the clinic for follow-up. The patient is here for a follow up on chronic medical problems. Her medications were reviewed, pharmacy updated, notes reviewed, prior labs reviewed.  Patient is endorsing symptoms of depression as she has had unfortunately multiple family members passed away this year.  PHQ-9 score of 12 as patient has little interest in doing anything other than sitting at home and has been feeling hopelessness. She has been reporting insomnia as well, with nearly every day having difficulty going to sleep. She reports little energy during the day.  Patient denies suicidal ideation. Patient wishes to speak with behavioral therapy and is open to receiving medication.     Patient remains on 3 to 4 L nasal cannula during the day.  She has scheduled follow-up with pulmonology.  Patient had seen cardiology Dr. Marrero in the past but has not seen them recently.    ROS   Review of Systems   Constitutional:  Positive for fatigue. Negative for fever.   Respiratory:  Negative for chest tightness.    Cardiovascular:  Negative for chest pain and palpitations.   Genitourinary:  Negative for difficulty urinating.    Neurological:  Negative for dizziness.   Psychiatric/Behavioral:  Positive for sleep disturbance. Negative for behavioral problems, decreased concentration, self-injury and suicidal ideas.       Past History     PMHx:    has a past medical history of Body mass index (BMI) 37.0-37.9, adult (2019), Body mass index (BMI) 38.0-38.9, adult (2022), Drug-induced obesity (2022), Morbid (severe) obesity due to excess calories (Multi) (2022), and Personal history of other specified conditions (2020).   Allergies:   No Known Allergies   Surgical Hx:   Past Surgical History:   Procedure Laterality Date    HYSTERECTOMY  2013    Hysterectomy      Social HX:   Social History     Tobacco Use    Smoking status: Former     Current packs/day: 0.00     Average packs/day: 1 pack/day for 20.0 years (20.0 ttl pk-yrs)     Types: Cigarettes     Start date:      Quit date:      Years since quittin.2     Passive exposure: Past    Smokeless tobacco: Never   Substance Use Topics    Alcohol use: Yes     Comment: socially    Drug use: Never      MEDS:   Current Outpatient Medications   Medication Instructions    albuterol 90 mcg/actuation inhaler 1 puff, inhalation, Every 4 hours PRN, INHALE 1 PUFF BY MOUTH EVERY 4 HOURS AS NEEDED    alendronate (FOSAMAX) 70 mg, oral, Every 7 days, Take in the morning with a full glass of water, on an empty stomach, and do not take anything else by mouth or lie down for the next 30 min. Thurs    amLODIPine (NORVASC) 10 mg, oral, Daily, for blood pressure    apixaban (ELIQUIS) 5 mg, oral, 2 times daily    atorvastatin (LIPITOR) 20 mg, oral, Every evening    calcium carbonate (TUMS) 1,500 mg, oral, Daily    cholecalciferol (VITAMIN D3) 50 mcg, oral, Daily    dorzolamide (Trusopt) 2 % ophthalmic solution 1 drop, Left Eye, 3 times daily    fluticasone furoate-vilanteroL (Breo Ellipta) 100-25 mcg/dose inhaler 1 puff, inhalation, Daily    ipratropium-albuteroL  (Duo-Neb) 0.5-2.5 mg/3 mL nebulizer solution 3 mL, nebulization, Every 6 hours PRN    magnesium oxide (MAG-OX) 400 mg, oral, Daily    metoprolol tartrate (LOPRESSOR) 25 mg, oral, 2 times daily    netarsudiL-latanoprost (Rocklatan) 0.02-0.005 % drops 1 drop, Right Eye, Nightly    PARoxetine (PAXIL) 10 mg, oral, Every evening    prednisoLONE acetate (Pred-Forte) 1 % ophthalmic suspension 1 drop, Left Eye, 2 times daily    timolol (Timoptic) 0.5 % ophthalmic solution 1 drop, Left Eye, Daily      Objective      Visit Vitals  /82 (BP Location: Right arm, Patient Position: Sitting, BP Cuff Size: Large adult)   Pulse 93      BMI Readings from Last 15 Encounters:   03/21/25 36.18 kg/m²   12/18/24 36.18 kg/m²   10/04/24 36.18 kg/m²   09/25/24 35.44 kg/m²   09/24/24 35.87 kg/m²   06/05/24 35.57 kg/m²   03/05/24 35.47 kg/m²   10/03/23 35.44 kg/m²   06/27/23 34.70 kg/m²   04/12/23 36.03 kg/m²   02/24/23 36.48 kg/m²   02/08/23 36.03 kg/m²   11/09/22 37.07 kg/m²   04/05/22 38.69 kg/m²   11/30/21 38.69 kg/m²      Lab Results   Component Value Date    HGBA1C 5.8 (H) 06/05/2024      Lab Results   Component Value Date    HGBA1C 5.8 (H) 06/05/2024    HGBA1C 5.6 10/03/2023    HGBA1C 6.3 (A) 06/07/2023     Lab Results   Component Value Date    LDLCALC 78 06/05/2024    CREATININE 0.54 09/27/2024      Lab Results   Component Value Date    WBC 9.5 09/27/2024    HGB 12.8 09/27/2024    HCT 39.9 09/27/2024     09/27/2024     09/27/2024        Chemistry    Lab Results   Component Value Date/Time     09/27/2024 0626    K 4.1 09/27/2024 0626     09/27/2024 0626    CO2 32 09/27/2024 0626    BUN 12 09/27/2024 0626    CREATININE 0.54 09/27/2024 0626    Lab Results   Component Value Date/Time    CALCIUM 9.1 09/27/2024 0626    ALKPHOS 63 09/25/2024 0642    AST 14 09/25/2024 0642    ALT 13 09/25/2024 0642    BILITOT 0.8 09/25/2024 0642             Physical Exam  Physical Exam  Constitutional:       General: She is not  in acute distress.  HENT:      Head: Normocephalic.   Eyes:      General: No scleral icterus.     Conjunctiva/sclera: Conjunctivae normal.   Cardiovascular:      Rate and Rhythm: Normal rate and regular rhythm.      Pulses: Normal pulses.      Heart sounds: Normal heart sounds.   Pulmonary:      Effort: Pulmonary effort is normal. No respiratory distress.      Breath sounds: Normal breath sounds. No wheezing.   Neurological:      Mental Status: She is alert and oriented to person, place, and time. Mental status is at baseline.      Sensory: No sensory deficit.      Motor: No weakness.   Psychiatric:      Comments: Depressed mood  PHQ-9 of 12        Assessment and Plan     Assessment/Plan    The following medical issues were discussed during this encounter  :       Mary Ann was seen today for follow-up.  Diagnoses and all orders for this visit:  Current moderate episode of major depressive disorder without prior episode (Multi) (Primary)  PHQ-9 of 12, patient has had multiple stressors in her life recently including multiple deaths in her family this year  Will evaluate vitamin B12, TSH, and vitamin D levels  Ordered patient paroxetine 10 mg to be taken in the evening to help with her depressive symptoms as well as her insomnia  Placed referral for behavioral health  -     Vitamin B12; Future  -     Tsh With Reflex To Free T4 If Abnormal; Future  -     Vitamin D 25-Hydroxy,Total (for eval of Vitamin D levels); Future  -     PARoxetine (Paxil) 10 mg tablet; Take 1 tablet (10 mg) by mouth once daily in the evening.  -     Referral to [facility]; Future  -     Vitamin B12  -     Tsh With Reflex To Free T4 If Abnormal  -     Vitamin D 25-Hydroxy,Total (for eval of Vitamin D levels)  Atrial fibrillation, unspecified type (Multi)  Refilled patient's Eliquis  -     apixaban (Eliquis) 5 mg tablet; Take 1 tablet (5 mg) by mouth 2 times a day.  -     Referral to Cardiology; Future  Borderline  hyperlipidemia  The 10-year ASCVD risk score (Marry QURESHI, et al., 2019) is: 10%    Values used to calculate the score:      Age: 79 years      Sex: Female      Is Non- : Yes      Diabetic: No      Tobacco smoker: No      Systolic Blood Pressure: 122 mmHg      Is BP treated: Yes      HDL Cholesterol: 43.7 mg/dL      Total Cholesterol: 135 mg/dL   Ordered lipid panel  Patient has had multiple family members die from cardiovascular events this year  On atorvastatin  Placed referral to cardiology, patient has seen them several years ago  -     Lipid panel; Future  -     Lipid panel  Essential hypertension  Blood pressure of 122/82 in the clinic today  Routine lab work ordered  Continue current management  Continue to check blood pressure daily   -     Comprehensive metabolic panel; Future  -     Albumin-Creatinine Ratio, Urine Random; Future  -     Hemoglobin A1c; Future  -     Comprehensive metabolic panel  -     Albumin-Creatinine Ratio, Urine Random  -     Hemoglobin A1c  Hyperglycemia  Will evaluate A1c today, patient is not on antidiabetic medications at this time  -     Hemoglobin A1c; Future  -     Hemoglobin A1c  Hypovitaminosis D  Ordered vitamin D level today  -     Vitamin D 25-Hydroxy,Total (for eval of Vitamin D levels); Future  -     Vitamin D 25-Hydroxy,Total (for eval of Vitamin D levels)         Health maintenance  The following screening tests were ordered: Routine lab work    Immunizations: UTD     Please return in 6 weeks for evaluation of depressive symptoms on paroxetine or if symptoms worsen     Alek Hunt,   Internal Medicine, PGY- 1  03/21/25 at 12:28 PM

## 2025-04-08 ENCOUNTER — OFFICE VISIT (OUTPATIENT)
Dept: ORTHOPEDIC SURGERY | Facility: CLINIC | Age: 80
End: 2025-04-08
Payer: MEDICARE

## 2025-04-08 DIAGNOSIS — M17.0 BILATERAL PRIMARY OSTEOARTHRITIS OF KNEE: Primary | ICD-10-CM

## 2025-04-08 PROCEDURE — 99213 OFFICE O/P EST LOW 20 MIN: CPT | Mod: 25 | Performed by: STUDENT IN AN ORGANIZED HEALTH CARE EDUCATION/TRAINING PROGRAM

## 2025-04-08 PROCEDURE — 1036F TOBACCO NON-USER: CPT | Performed by: STUDENT IN AN ORGANIZED HEALTH CARE EDUCATION/TRAINING PROGRAM

## 2025-04-08 PROCEDURE — 1159F MED LIST DOCD IN RCRD: CPT | Performed by: STUDENT IN AN ORGANIZED HEALTH CARE EDUCATION/TRAINING PROGRAM

## 2025-04-08 PROCEDURE — 99213 OFFICE O/P EST LOW 20 MIN: CPT | Performed by: STUDENT IN AN ORGANIZED HEALTH CARE EDUCATION/TRAINING PROGRAM

## 2025-04-08 PROCEDURE — 20610 DRAIN/INJ JOINT/BURSA W/O US: CPT | Mod: 50 | Performed by: STUDENT IN AN ORGANIZED HEALTH CARE EDUCATION/TRAINING PROGRAM

## 2025-04-08 PROCEDURE — 2500000004 HC RX 250 GENERAL PHARMACY W/ HCPCS (ALT 636 FOR OP/ED): Performed by: STUDENT IN AN ORGANIZED HEALTH CARE EDUCATION/TRAINING PROGRAM

## 2025-04-08 RX ORDER — TRIAMCINOLONE ACETONIDE 40 MG/ML
1 INJECTION, SUSPENSION INTRA-ARTICULAR; INTRAMUSCULAR
Status: COMPLETED | OUTPATIENT
Start: 2025-04-08 | End: 2025-04-08

## 2025-04-08 RX ORDER — LIDOCAINE HYDROCHLORIDE 10 MG/ML
4 INJECTION, SOLUTION INFILTRATION; PERINEURAL
Status: COMPLETED | OUTPATIENT
Start: 2025-04-08 | End: 2025-04-08

## 2025-04-08 RX ADMIN — TRIAMCINOLONE ACETONIDE 1 ML: 40 INJECTION, SUSPENSION INTRA-ARTICULAR; INTRAMUSCULAR at 21:42

## 2025-04-08 RX ADMIN — LIDOCAINE HYDROCHLORIDE 4 ML: 10 INJECTION, SOLUTION INFILTRATION; PERINEURAL at 21:42

## 2025-04-08 NOTE — PROGRESS NOTES
HPI: Mary Ann Forde is a pleasant 78 y.o. year-old female who is seen today for follow-up evaluation of bilateral knee pain. She has been following with our office for repeat CSI injections. Her last injection was in 10/22/24 to his bilateral knees. Intra-articular injection provided her with some relief for two months but she returns today for worsening knee pain.     The left knee continues to be more symptomatic than the right.  In her home, the patient is able to ambulate mostly without assistive devices but does  onto furniture as she walks for stability.  At the house, she does use a walker or wheelchair as needed.  She does complain of instability in her knees.  She has significant pain which is refractory to most medications.  She does use topical medications as well as Tylenol.     The patient does have a significant medical history.  She does have a history of COPD on home oxygen somewhere between 3-4 liters at any given time, pulmonary hypertension, atrial fibrillation on Eliquis and obesity. She    Physical Examination:   General: Patient is alert and oriented x 3 and appears comfortable.  Gait: Patient presents in wheelchair  Right Knee: ROM 0-90  Left knee: 10-90  Stable to varus and valgus stress at 0 and 30 degrees.   Patella tracks midline  Expected post operative swelling and tenderness   Calf: No swelling or tenderness  Able to dorsi-flex and plantar-flex the ankle with appropriate strength. Able to wiggle all toes.   The foot is warm and well perfused with palpable pulses.   Sensation is intact to light touch.     Radiographs: None today    Assessment and Plan: Mary Ann Forde is a 78 y.o. female with end-stage osteoarthritis of both knees. Her medical comorbidities place her at high risk of perioperative and postoperative complication if we were to move forward with total joint replacement.  Specifically, her pulmonary hypertension and oxygen dependence preoperatively are concerning.  I  discussed these concerns with the patient.  She has elected to proceed with another round of CSI.    PLAN:  At this time, the patient would like to continue with conservative treatment.  I think it is reasonable.  Will repeat the injections today.  I will see the patient back in 3 months without radiographs to reassess her symptoms and response to the intra-articular injections.    Patient ID: Mary Ann Forde is a 79 y.o. female.    L Inj/Asp: bilateral knee on 4/8/2025 9:42 PM  Indications: pain and joint swelling  Details: 22 G needle, anterolateral approach  Medications (Right): 1 mL triamcinolone acetonide 40 mg/mL; 4 mL lidocaine 10 mg/mL (1 %)  Medications (Left): 1 mL triamcinolone acetonide 40 mg/mL; 4 mL lidocaine 10 mg/mL (1 %)  Outcome: tolerated well, no immediate complications    Discussion:  I discussed the conservative treatment options for knee osteoarthritis including but not limited to physical therapy, oral NSAIDS, activity and lifestyle modification, and corticosteroid injections. Pt has elected to undergo a cortisone injection today. I have explained the risk and benefits of an injection including the possibility of joint infection, bleeding, damage to cartilage, allergic reaction. Patient verbalized understanding and gave verbal consent wishes to proceed with a intra-articular cortisone injection for their knee.    Procedure:  After discussing the risk and benefits of the procedure, we proceeded with an intra-articular bilateral knee injection.    With the patient's informed verbal consent, the bilateral knees were prepped in standard sterile fashion with Chlorhexidine. The skin was then anesthetized with ethyl chloride spray and cleaned again with Chlorhexidine. The bilateral knees were then apirated/injected with a prefilled 20-gauge syringe of 40 mg Kenalog + 4 ml Lidocaine using the lateral approach without complications.  The patient tolerated this well and felt immediate initial relief of  symptoms. A bandaid was applied and the patient ambulated out of the clinic on ther own accord without difficulty. Patient was instructed to avoid physical activity for 24-48 hours to prevent the knees from swelling and may ice the knees as tolerated. Patient should contact the office if any signs of of infection appear: redness, fever, chills, drainage, swelling or warmth to the knees.  Pt understands that the injections can be repeated no sooner than 3 months.      Procedure, treatment alternatives, risks and benefits explained, specific risks discussed. Consent was given by the patient. Immediately prior to procedure a time out was called to verify the correct patient, procedure, equipment, support staff and site/side marked as required. Patient was prepped and draped in the usual sterile fashion.

## 2025-04-09 LAB
25(OH)D3+25(OH)D2 SERPL-MCNC: 42 NG/ML (ref 30–100)
ALBUMIN SERPL-MCNC: 4.2 G/DL (ref 3.6–5.1)
ALP SERPL-CCNC: 63 U/L (ref 37–153)
ALT SERPL-CCNC: 21 U/L (ref 6–29)
ANION GAP SERPL CALCULATED.4IONS-SCNC: 8 MMOL/L (CALC) (ref 7–17)
AST SERPL-CCNC: 19 U/L (ref 10–35)
BILIRUB SERPL-MCNC: 1.3 MG/DL (ref 0.2–1.2)
BUN SERPL-MCNC: 8 MG/DL (ref 7–25)
CALCIUM SERPL-MCNC: 9.9 MG/DL (ref 8.6–10.4)
CHLORIDE SERPL-SCNC: 103 MMOL/L (ref 98–110)
CHOLEST SERPL-MCNC: 134 MG/DL
CHOLEST/HDLC SERPL: 2.4 (CALC)
CO2 SERPL-SCNC: 31 MMOL/L (ref 20–32)
CREAT SERPL-MCNC: 0.57 MG/DL (ref 0.6–1)
EGFRCR SERPLBLD CKD-EPI 2021: 94 ML/MIN/1.73M2
EST. AVERAGE GLUCOSE BLD GHB EST-MCNC: 123 MG/DL
EST. AVERAGE GLUCOSE BLD GHB EST-SCNC: 6.8 MMOL/L
GLUCOSE SERPL-MCNC: 109 MG/DL (ref 65–99)
HBA1C MFR BLD: 5.9 % OF TOTAL HGB
HDLC SERPL-MCNC: 55 MG/DL
LDLC SERPL CALC-MCNC: 65 MG/DL (CALC)
NONHDLC SERPL-MCNC: 79 MG/DL (CALC)
POTASSIUM SERPL-SCNC: 4.6 MMOL/L (ref 3.5–5.3)
PROT SERPL-MCNC: 7.1 G/DL (ref 6.1–8.1)
SODIUM SERPL-SCNC: 142 MMOL/L (ref 135–146)
TRIGL SERPL-MCNC: 55 MG/DL
TSH SERPL-ACNC: 0.61 MIU/L (ref 0.4–4.5)
VIT B12 SERPL-MCNC: 931 PG/ML (ref 200–1100)

## 2025-04-14 ENCOUNTER — OFFICE VISIT (OUTPATIENT)
Dept: PULMONOLOGY | Facility: CLINIC | Age: 80
End: 2025-04-14
Payer: MEDICARE

## 2025-04-14 VITALS
BODY MASS INDEX: 36.29 KG/M2 | HEIGHT: 69 IN | OXYGEN SATURATION: 92 % | RESPIRATION RATE: 18 BRPM | WEIGHT: 245 LBS | DIASTOLIC BLOOD PRESSURE: 78 MMHG | TEMPERATURE: 97.7 F | SYSTOLIC BLOOD PRESSURE: 126 MMHG | HEART RATE: 70 BPM

## 2025-04-14 DIAGNOSIS — J98.6 ELEVATED DIAPHRAGM: ICD-10-CM

## 2025-04-14 DIAGNOSIS — J44.9 CHRONIC OBSTRUCTIVE PULMONARY DISEASE, UNSPECIFIED COPD TYPE (MULTI): Primary | ICD-10-CM

## 2025-04-14 DIAGNOSIS — J96.11 CHRONIC HYPOXIC RESPIRATORY FAILURE: ICD-10-CM

## 2025-04-14 PROCEDURE — 1159F MED LIST DOCD IN RCRD: CPT | Performed by: INTERNAL MEDICINE

## 2025-04-14 PROCEDURE — 99214 OFFICE O/P EST MOD 30 MIN: CPT | Performed by: INTERNAL MEDICINE

## 2025-04-14 PROCEDURE — 3074F SYST BP LT 130 MM HG: CPT | Performed by: INTERNAL MEDICINE

## 2025-04-14 PROCEDURE — 1036F TOBACCO NON-USER: CPT | Performed by: INTERNAL MEDICINE

## 2025-04-14 PROCEDURE — 99204 OFFICE O/P NEW MOD 45 MIN: CPT | Performed by: INTERNAL MEDICINE

## 2025-04-14 PROCEDURE — 3078F DIAST BP <80 MM HG: CPT | Performed by: INTERNAL MEDICINE

## 2025-04-14 ASSESSMENT — COPD QUESTIONNAIRES
QUESTION3_CHESTTIGHTNESS: 1
QUESTION8_ENERGYLEVEL: 4
QUESTION2_CHESTPHLEGM: 0 - I HAVE NO PHLEGM (MUCUS) IN MY CHEST AT ALL
CAT_TOTALSCORE: 11
QUESTION5_HOMEACTIVITIES: 2
QUESTION4_WALKINCLINE: 2
QUESTION7_SLEEPQUALITY: 2
QUESTION1_COUGHFREQUENCY: 0 - I NEVER COUGH
QUESTION6_LEAVINGHOUSE: 0 - I AM CONFIDENT LEAVING MY HOME DESPITE MY LUNG CONDITION

## 2025-04-14 NOTE — LETTER
April 14, 2025     Coretta Silver MD  [address]  [address]  [facility] [address]    Patient: Mary Ann Forde   YOB: 1945   Date of Visit: 4/14/2025       Dear Dr. Coretta Silver MD:    Thank you for referring Mary Ann Forde to me for evaluation. Below are my notes for this consultation.  If you have questions, please do not hesitate to call me. I look forward to following your patient along with you.       Sincerely,     Flori Amador, DO      CC: No Recipients  ______________________________________________________________________________________        Department of Medicine  Division of Pulmonary, Critical Care, and Sleep Medicine  Location  [facility], [address]    I was asked by Coretta Silver MD, to evaluate Mary Ann Forde for COPD and hypoxic respiratory failure. I have independently interviewed and examined the patient in the office and reviewed available records.     Physician HPI (4/14/2025):  79 y.o. female with longstanding history of COPD (no PFT on file) and chronic hypoxic respiratory failure since at least 2019.  She is a former smoker quit over 30 years ago.  She is on 3.5 to 4 L/min O2.  She has been maintained on Breo Ellipta for many years.  She uses albuterol 3-4 times per day usually with activities.  She is pending a left knee arthroplasty in the near future.  Today, she denies any respiratory complaints, cough, wheeze, sputum production, shortness of breath. She was admitted to [facility] in September 2024 for possible COPD exacerbation, however, this was in setting of suspected upper lobe bronchitis versus pneumonia.  She feels she has recovered 100% since her admission.    Reviewing multiple chest x-rays, she appears to have a left hemidiaphragm elevation dating back to at least 2013. She denies any neck or thoracic surgery, hernia repairs, chest trauma. Her exercise tolerance is limited by both dyspnea and knee arthritis.    PMH:  Past Medical  History:   Diagnosis Date   • Body mass index (BMI) 37.0-37.9, adult 2019    BMI 37.0-37.9, adult   • Body mass index (BMI) 38.0-38.9, adult 2022    BMI 38.0-38.9,adult   • Drug-induced obesity 2022    Class 2 drug-induced obesity with body mass index (BMI) of 38.0 to 38.9 in adult, unspecified whether serious comorbidity present   • Morbid (severe) obesity due to excess calories (Multi) 2022    Severe obesity (BMI 35.0-39.9) with comorbidity   • Personal history of other specified conditions 2020    History of bacteremia       PSH:  Past Surgical History:   Procedure Laterality Date   • HYSTERECTOMY  2013    Hysterectomy       FHx:  No family history on file.    Social Hx:  Social History     Socioeconomic History   • Marital status: Single   Tobacco Use   • Smoking status: Former     Current packs/day: 0.00     Average packs/day: 1 pack/day for 20.0 years (20.0 ttl pk-yrs)     Types: Cigarettes     Start date:      Quit date:      Years since quittin.2     Passive exposure: Past   • Smokeless tobacco: Never   Substance and Sexual Activity   • Alcohol use: Yes     Comment: socially   • Drug use: Never     Social Drivers of Health     Financial Resource Strain: Low Risk  (2024)    Overall Financial Resource Strain (CARDIA)    • Difficulty of Paying Living Expenses: Not very hard   Transportation Needs: No Transportation Needs (2024)    PRAPARE - Transportation    • Lack of Transportation (Medical): No    • Lack of Transportation (Non-Medical): No   Housing Stability: Low Risk  (2024)    Housing Stability Vital Sign    • Unable to Pay for Housing in the Last Year: No    • Number of Times Moved in the Last Year: 1    • Homeless in the Last Year: No       Immunization History:  Immunization History   Administered Date(s) Administered   • Flu vaccine (IIV4), preservative free *Check age/dose* 2015   • Flu vaccine, quadrivalent, high-dose, preservative  free, age 65y+ (FLUZONE) 11/09/2022, 10/03/2023   • Flu vaccine, trivalent, preservative free, HIGH-DOSE, age 65y+ (Fluzone) 10/10/2017, 11/09/2018, 01/25/2021, 09/21/2021, 10/04/2024   • Flu vaccine, trivalent, preservative free, age 6 months and greater (Fluarix/Fluzone/Flulaval) 09/07/2011, 09/05/2012, 09/18/2013, 10/07/2014, 11/09/2022   • Pfizer COVID-19 vaccine, 12 years and older, (30mcg/0.3mL) (Comirnaty) 11/02/2024   • Pfizer Purple Cap SARS-CoV-2 03/14/2021, 04/11/2021, 04/11/2021, 01/11/2022   • Pneumococcal conjugate vaccine, 13-valent (PREVNAR 13) 10/10/2017   • Pneumococcal polysaccharide vaccine, 23-valent, age 2 years and older (PNEUMOVAX 23) 11/09/2018   • Tdap vaccine, age 7 year and older (BOOSTRIX, ADACEL) 10/03/2023   • Zoster vaccine, recombinant, adult (SHINGRIX) 02/13/2019       Current Medications:  Current Outpatient Medications   Medication Instructions   • albuterol 90 mcg/actuation inhaler 1 puff, inhalation, Every 4 hours PRN, INHALE 1 PUFF BY MOUTH EVERY 4 HOURS AS NEEDED   • alendronate (FOSAMAX) 70 mg, oral, Every 7 days, Take in the morning with a full glass of water, on an empty stomach, and do not take anything else by mouth or lie down for the next 30 min. Thurs   • amLODIPine (NORVASC) 10 mg, oral, Daily, for blood pressure   • apixaban (ELIQUIS) 5 mg, oral, 2 times daily   • atorvastatin (LIPITOR) 20 mg, oral, Every evening   • calcium carbonate (TUMS) 1,500 mg, oral, Daily   • cholecalciferol (VITAMIN D3) 50 mcg, Daily   • dorzolamide (Trusopt) 2 % ophthalmic solution 1 drop, Left Eye, 3 times daily   • fluticasone furoate-vilanteroL (Breo Ellipta) 100-25 mcg/dose inhaler 1 puff, inhalation, Daily   • ipratropium-albuteroL (Duo-Neb) 0.5-2.5 mg/3 mL nebulizer solution 3 mL, nebulization, Every 6 hours PRN   • magnesium oxide (MAG-OX) 400 mg, Daily   • metoprolol tartrate (LOPRESSOR) 25 mg, oral, 2 times daily   • netarsudiL-latanoprost (Rocklatan) 0.02-0.005 % drops 1 drop,  "Nightly   • PARoxetine (PAXIL) 10 mg, oral, Every evening   • prednisoLONE acetate (Pred-Forte) 1 % ophthalmic suspension 1 drop, 2 times daily   • timolol (Timoptic) 0.5 % ophthalmic solution 1 drop, Left Eye, Daily        Drug Allergies/Intolerances:  No Known Allergies     Review of Systems:  Review of Systems     All other review of systems are negative and/or non-contributory.    Physical Examination:  Vitals:    04/14/25 1446   BP: 126/78   BP Location: Right arm   Patient Position: Sitting   Pulse: 70   Resp: 18   Temp: 36.5 °C (97.7 °F)   TempSrc: Temporal   SpO2: 92%   Weight: 111 kg (245 lb)   Height: 1.753 m (5' 9\")        GEN: appears well  ENT: Mallampati III,   CV: RRR, no m/g/r  LUNGS: good effort, clear bilaterally, no w/r/r  EXT: no edema, cyanosis, clubbing      Exacerbation History      September 2024 (admitted)    Pulmonary Function Test Results     O2 desat study 5/13/2021:  84% on room air with ambulation  96% on room air at rest  94% with exercise while on oxygen    Sleep Study     None    CAT and mMRC     COPD Assessment Test (CAT)      I never cough 0 I cough all the time   I have no phlegm (mucus) in my chest at all 0 My chest is completely full of phlegm (mucus)   My chest does not feel tight at all 1 My chest feels very tight   When I walk up a hill or one flight of stairs I am not breathless 2 When I walk up a hill or one flight of stairs I am very breathless   I am not limited doing any activities at home 2 I am very limited doing activities at home   I am confident leaving my home despite my lung condition 0 I am not at all confident leaving my home because of my lung condition   I sleep soundly 2 I don't sleep soundly because of my lung condition   I have lots of energy 4 I have no energy at all     Total: 11    >30 Very High  >20 High  10-20 Medium  <10 Low  5 upper limit of normal in healthy adults    Reference: Feliberto PW, Fernando G, Nirmal P, Ema I, Lydia WH, Guillermo Villarreal N. " Development and first validation of the COPD Assessment Test. Eur Respir J. 2009;34(3):648-54.        mMRC (Modified Medical Research [place]) Dyspnea Scale  Stops for breath after walking 100 yards (91m) or after a few minutes: +3    Reference: Paty DA, Alex CK. Evaluation of clinical methods for rating dyspnea. CHEST. 1988;93(3):580-6.    Peak Flow and ACT     N/A    Chest Radiograph     XR chest 2 views 09/24/2024    Narrative  STUDY:  Chest Radiographs;  9/24/2024 3:53PM  INDICATION:  Shortness of breath, hypoxia.  COMPARISON:  4/29/2022 XR Chest.  ACCESSION NUMBER(S):  [id number]  ORDERING CLINICIAN:  JAMAL PEÑALOZA  TECHNIQUE:  Frontal and lateral chest.  FINDINGS:  CARDIOMEDIASTINAL SILHOUETTE:  Cardiomediastinal silhouette is normal in size and configuration.    LUNGS:  There is right middle lobe consolidation.  Remainder the lungs are  clear.  ABDOMEN:  No remarkable upper abdominal findings.    BONES:  No acute osseous changes.    Impression  New right middle lobe consolidation.  Follow-up CT scan is recommended  to differentiate underlying neoplasm versus pneumonic process.  Signed by Bridger Golden M.D.      7/21/2013: left hemidiaphragm elevation    Chest CT Scan     CT angio chest for pulmonary embolism 09/24/2024    Impression  Negative for pulmonary embolism or thoracic aortic dissection.  Hazy groundglass pulmonary infiltrates within the left upper lobe and  right upper lobe.  Elevation right diaphragm with compressive atelectasis right middle  lobe.  Cardiomegaly.  Age indeterminate compression fracture T7 with 50% loss vertebral body  height.  No CT findings of pulmonary mass.  Signed by Basil Plata MD       Bronchoscopy     None    Labs     Lab Results   Component Value Date    WBC 9.5 09/27/2024    HGB 12.8 09/27/2024    HCT 39.9 09/27/2024     09/27/2024     09/27/2024     Lab Results   Component Value Date     (H) 09/24/2024     Lab Results   Component Value Date     EOSABS 0.00 09/25/2024    EOSABS 0.78 (H) 09/24/2024    EOSABS 0.04 04/29/2022    EOSABS 0.58 (H) 04/24/2022    EOSABS 0.02 06/17/2020         Echocardiogram     9/25/2024:  1. Poorly visualized anatomical structures due to suboptimal image quality.   2. Left ventricular ejection fraction is hyperdynamic, by visual estimate at 75-80%.   3. Spectral Doppler shows an abnormal pattern of left ventricular diastolic filling.   4. Left ventricular cavity size is decreased.   5. There is normal right ventricular global systolic function.   6. The left atrium is severely dilated.   7. Absent A-wave on MV spectral Doppler tracing, consistent with atrial fibrillation.   8. Mild to moderate tricuspid regurgitation visualized.   9. Moderately elevated right ventricular systolic pressure.         ASSESSMENT & PLAN     Problem List Items Addressed This Visit       Chronic hypoxic respiratory failure    Chronic obstructive pulmonary disease, unspecified - Primary    Relevant Orders    Complete Pulmonary Function Test Pre/Post Bronchodilator (Spirometry Pre/Post/DLCO/Lung Volumes)    Exhaled Nitric Oxide (FeNO)    Elevated diaphragm        Summary:  79 y.o. female with COPD (no PFT on file) and chronic hypoxic respiratory failure on 4L/min noted to have left hemidiaphragm elevation.  She appears to be doing well overall.  She had previously spikes in peripheral eosinophil count.  Most recently, it is 0.    Plan:  -Get PFT with FeNO testing for baseline  - Her left hemidiaphragm may need further workup with sniff testing pending results of her PFT.  - Continue supplemental oxygen  - Will continue her on Breo Ellipta for now, until we get results from PFT.    Follow-up: 5/27/2025        Flori Amador DO  Staff Physician - Pulmonary & Critical Care  04/14/25 3:05 PM  Office number: [phone]   Fax number:  [phone]

## 2025-04-14 NOTE — PROGRESS NOTES
Department of Medicine  Division of Pulmonary, Critical Care, and Sleep Medicine  Location  [facility], [address]    I was asked by Coretta Silver MD, to evaluate Mary Ann Forde for COPD and hypoxic respiratory failure. I have independently interviewed and examined the patient in the office and reviewed available records.     Physician HPI (4/14/2025):  79 y.o. female with longstanding history of COPD (no PFT on file) and chronic hypoxic respiratory failure since at least 2019.  She is a former smoker quit over 30 years ago.  She is on 3.5 to 4 L/min O2.  She has been maintained on Breo Ellipta for many years.  She uses albuterol 3-4 times per day usually with activities.  She is pending a left knee arthroplasty in the near future.  Today, she denies any respiratory complaints, cough, wheeze, sputum production, shortness of breath. She was admitted to [facility] in September 2024 for possible COPD exacerbation, however, this was in setting of suspected upper lobe bronchitis versus pneumonia.  She feels she has recovered 100% since her admission.    Reviewing multiple chest x-rays, she appears to have a left hemidiaphragm elevation dating back to at least 2013. She denies any neck or thoracic surgery, hernia repairs, chest trauma. Her exercise tolerance is limited by both dyspnea and knee arthritis.    PMH:  Past Medical History:   Diagnosis Date    Body mass index (BMI) 37.0-37.9, adult 02/13/2019    BMI 37.0-37.9, adult    Body mass index (BMI) 38.0-38.9, adult 04/05/2022    BMI 38.0-38.9,adult    Drug-induced obesity 04/05/2022    Class 2 drug-induced obesity with body mass index (BMI) of 38.0 to 38.9 in adult, unspecified whether serious comorbidity present    Morbid (severe) obesity due to excess calories (Multi) 01/11/2022    Severe obesity (BMI 35.0-39.9) with comorbidity    Personal history of other specified conditions 06/19/2020    History of bacteremia       PSH:  Past Surgical History:    Procedure Laterality Date    HYSTERECTOMY  2013    Hysterectomy       FHx:  No family history on file.    Social Hx:  Social History     Socioeconomic History    Marital status: Single   Tobacco Use    Smoking status: Former     Current packs/day: 0.00     Average packs/day: 1 pack/day for 20.0 years (20.0 ttl pk-yrs)     Types: Cigarettes     Start date:      Quit date:      Years since quittin.2     Passive exposure: Past    Smokeless tobacco: Never   Substance and Sexual Activity    Alcohol use: Yes     Comment: socially    Drug use: Never     Social Drivers of Health     Financial Resource Strain: Low Risk  (2024)    Overall Financial Resource Strain (CARDIA)     Difficulty of Paying Living Expenses: Not very hard   Transportation Needs: No Transportation Needs (2024)    PRAPARE - Transportation     Lack of Transportation (Medical): No     Lack of Transportation (Non-Medical): No   Housing Stability: Low Risk  (2024)    Housing Stability Vital Sign     Unable to Pay for Housing in the Last Year: No     Number of Times Moved in the Last Year: 1     Homeless in the Last Year: No       Immunization History:  Immunization History   Administered Date(s) Administered    Flu vaccine (IIV4), preservative free *Check age/dose* 2015    Flu vaccine, quadrivalent, high-dose, preservative free, age 65y+ (FLUZONE) 2022, 10/03/2023    Flu vaccine, trivalent, preservative free, HIGH-DOSE, age 65y+ (Fluzone) 10/10/2017, 2018, 2021, 2021, 10/04/2024    Flu vaccine, trivalent, preservative free, age 6 months and greater (Fluarix/Fluzone/Flulaval) 2011, 2012, 2013, 10/07/2014, 2022    Pfizer COVID-19 vaccine, 12 years and older, (30mcg/0.3mL) (Comirnaty) 2024    Pfizer Purple Cap SARS-CoV-2 2021, 2021, 2021, 2022    Pneumococcal conjugate vaccine, 13-valent (PREVNAR 13) 10/10/2017    Pneumococcal polysaccharide  vaccine, 23-valent, age 2 years and older (PNEUMOVAX 23) 11/09/2018    Tdap vaccine, age 7 year and older (BOOSTRIX, ADACEL) 10/03/2023    Zoster vaccine, recombinant, adult (SHINGRIX) 02/13/2019       Current Medications:  Current Outpatient Medications   Medication Instructions    albuterol 90 mcg/actuation inhaler 1 puff, inhalation, Every 4 hours PRN, INHALE 1 PUFF BY MOUTH EVERY 4 HOURS AS NEEDED    alendronate (FOSAMAX) 70 mg, oral, Every 7 days, Take in the morning with a full glass of water, on an empty stomach, and do not take anything else by mouth or lie down for the next 30 min. Thurs    amLODIPine (NORVASC) 10 mg, oral, Daily, for blood pressure    apixaban (ELIQUIS) 5 mg, oral, 2 times daily    atorvastatin (LIPITOR) 20 mg, oral, Every evening    calcium carbonate (TUMS) 1,500 mg, oral, Daily    cholecalciferol (VITAMIN D3) 50 mcg, Daily    dorzolamide (Trusopt) 2 % ophthalmic solution 1 drop, Left Eye, 3 times daily    fluticasone furoate-vilanteroL (Breo Ellipta) 100-25 mcg/dose inhaler 1 puff, inhalation, Daily    ipratropium-albuteroL (Duo-Neb) 0.5-2.5 mg/3 mL nebulizer solution 3 mL, nebulization, Every 6 hours PRN    magnesium oxide (MAG-OX) 400 mg, Daily    metoprolol tartrate (LOPRESSOR) 25 mg, oral, 2 times daily    netarsudiL-latanoprost (Rocklatan) 0.02-0.005 % drops 1 drop, Nightly    PARoxetine (PAXIL) 10 mg, oral, Every evening    prednisoLONE acetate (Pred-Forte) 1 % ophthalmic suspension 1 drop, 2 times daily    timolol (Timoptic) 0.5 % ophthalmic solution 1 drop, Left Eye, Daily        Drug Allergies/Intolerances:  No Known Allergies     Review of Systems:  Review of Systems     All other review of systems are negative and/or non-contributory.    Physical Examination:  Vitals:    04/14/25 1446   BP: 126/78   BP Location: Right arm   Patient Position: Sitting   Pulse: 70   Resp: 18   Temp: 36.5 °C (97.7 °F)   TempSrc: Temporal   SpO2: 92%   Weight: 111 kg (245 lb)   Height: 1.753 m (5'  "9\")        GEN: appears well  ENT: Mallampati III,   CV: RRR, no m/g/r  LUNGS: good effort, clear bilaterally, no w/r/r  EXT: no edema, cyanosis, clubbing      Exacerbation History      September 2024 (admitted)    Pulmonary Function Test Results     O2 desat study 5/13/2021:  84% on room air with ambulation  96% on room air at rest  94% with exercise while on oxygen    Sleep Study     None    CAT and mMRC     COPD Assessment Test (CAT)      I never cough 0 I cough all the time   I have no phlegm (mucus) in my chest at all 0 My chest is completely full of phlegm (mucus)   My chest does not feel tight at all 1 My chest feels very tight   When I walk up a hill or one flight of stairs I am not breathless 2 When I walk up a hill or one flight of stairs I am very breathless   I am not limited doing any activities at home 2 I am very limited doing activities at home   I am confident leaving my home despite my lung condition 0 I am not at all confident leaving my home because of my lung condition   I sleep soundly 2 I don't sleep soundly because of my lung condition   I have lots of energy 4 I have no energy at all     Total: 11    >30 Very High  >20 High  10-20 Medium  <10 Low  5 upper limit of normal in healthy adults    Reference: Feliberto PW, Fernando G, Nirmal P, Ema I, Lydia WH, Guillermo Villarreal N. Development and first validation of the COPD Assessment Test. Eur Respir J. 2009;34(3):648-54.        mMRC (Modified Medical Research [place]) Dyspnea Scale  Stops for breath after walking 100 yards (91m) or after a few minutes: +3    Reference: Paty DA, Wells CK. Evaluation of clinical methods for rating dyspnea. CHEST. 1988;93(3):580-6.    Peak Flow and ACT     N/A    Chest Radiograph     XR chest 2 views 09/24/2024    Narrative  STUDY:  Chest Radiographs;  9/24/2024 3:53PM  INDICATION:  Shortness of breath, hypoxia.  COMPARISON:  4/29/2022 XR Chest.  ACCESSION NUMBER(S):  [id number]  ORDERING CLINICIAN:  JAMAL STRATTON" MARTELLA  TECHNIQUE:  Frontal and lateral chest.  FINDINGS:  CARDIOMEDIASTINAL SILHOUETTE:  Cardiomediastinal silhouette is normal in size and configuration.    LUNGS:  There is right middle lobe consolidation.  Remainder the lungs are  clear.  ABDOMEN:  No remarkable upper abdominal findings.    BONES:  No acute osseous changes.    Impression  New right middle lobe consolidation.  Follow-up CT scan is recommended  to differentiate underlying neoplasm versus pneumonic process.  Signed by Bridger Golden M.D.      7/21/2013: left hemidiaphragm elevation    Chest CT Scan     CT angio chest for pulmonary embolism 09/24/2024    Impression  Negative for pulmonary embolism or thoracic aortic dissection.  Hazy groundglass pulmonary infiltrates within the left upper lobe and  right upper lobe.  Elevation right diaphragm with compressive atelectasis right middle  lobe.  Cardiomegaly.  Age indeterminate compression fracture T7 with 50% loss vertebral body  height.  No CT findings of pulmonary mass.  Signed by Basil Plata MD       Bronchoscopy     None    Labs     Lab Results   Component Value Date    WBC 9.5 09/27/2024    HGB 12.8 09/27/2024    HCT 39.9 09/27/2024     09/27/2024     09/27/2024     Lab Results   Component Value Date     (H) 09/24/2024     Lab Results   Component Value Date    EOSABS 0.00 09/25/2024    EOSABS 0.78 (H) 09/24/2024    EOSABS 0.04 04/29/2022    EOSABS 0.58 (H) 04/24/2022    EOSABS 0.02 06/17/2020         Echocardiogram     9/25/2024:  1. Poorly visualized anatomical structures due to suboptimal image quality.   2. Left ventricular ejection fraction is hyperdynamic, by visual estimate at 75-80%.   3. Spectral Doppler shows an abnormal pattern of left ventricular diastolic filling.   4. Left ventricular cavity size is decreased.   5. There is normal right ventricular global systolic function.   6. The left atrium is severely dilated.   7. Absent A-wave on MV spectral Doppler  tracing, consistent with atrial fibrillation.   8. Mild to moderate tricuspid regurgitation visualized.   9. Moderately elevated right ventricular systolic pressure.         ASSESSMENT & PLAN     Problem List Items Addressed This Visit       Chronic hypoxic respiratory failure    Chronic obstructive pulmonary disease, unspecified - Primary    Relevant Orders    Complete Pulmonary Function Test Pre/Post Bronchodilator (Spirometry Pre/Post/DLCO/Lung Volumes)    Exhaled Nitric Oxide (FeNO)    Elevated diaphragm        Summary:  79 y.o. female with COPD (no PFT on file) and chronic hypoxic respiratory failure on 4L/min noted to have left hemidiaphragm elevation.  She appears to be doing well overall.  She had previously spikes in peripheral eosinophil count.  Most recently, it is 0.    Plan:  -Get PFT with FeNO testing for baseline  - Her left hemidiaphragm may need further workup with sniff testing pending results of her PFT.  - Continue supplemental oxygen  - Will continue her on Breo Ellipta for now, until we get results from PFT.    Follow-up: 5/27/2025        Flori Amador DO  Staff Physician - Pulmonary & Critical Care  04/14/25 3:05 PM  Office number: [phone]   Fax number:  [phone]

## 2025-05-16 ENCOUNTER — APPOINTMENT (OUTPATIENT)
Dept: PRIMARY CARE | Facility: CLINIC | Age: 80
End: 2025-05-16
Payer: MEDICARE

## 2025-05-22 DIAGNOSIS — M81.0 OSTEOPOROSIS, UNSPECIFIED OSTEOPOROSIS TYPE, UNSPECIFIED PATHOLOGICAL FRACTURE PRESENCE: ICD-10-CM

## 2025-05-22 RX ORDER — ALENDRONATE SODIUM 70 MG/1
70 TABLET ORAL
Qty: 12 TABLET | Refills: 0 | Status: SHIPPED | OUTPATIENT
Start: 2025-05-25

## 2025-05-23 ENCOUNTER — APPOINTMENT (OUTPATIENT)
Facility: CLINIC | Age: 80
End: 2025-05-23
Payer: MEDICARE

## 2025-05-30 ENCOUNTER — HOSPITAL ENCOUNTER (OUTPATIENT)
Facility: CLINIC | Age: 80
Discharge: HOME | End: 2025-05-30
Payer: MEDICARE

## 2025-05-30 DIAGNOSIS — J44.9 CHRONIC OBSTRUCTIVE PULMONARY DISEASE, UNSPECIFIED COPD TYPE (MULTI): ICD-10-CM

## 2025-05-30 PROCEDURE — 94729 DIFFUSING CAPACITY: CPT

## 2025-05-30 PROCEDURE — 94727 GAS DIL/WSHOT DETER LNG VOL: CPT

## 2025-05-30 PROCEDURE — 94060 EVALUATION OF WHEEZING: CPT

## 2025-06-21 DIAGNOSIS — I10 ESSENTIAL HYPERTENSION: ICD-10-CM

## 2025-06-21 DIAGNOSIS — I10 HYPERTENSION, UNSPECIFIED TYPE: ICD-10-CM

## 2025-06-21 DIAGNOSIS — E78.01 FAMILIAL HYPERCHOLESTEREMIA: ICD-10-CM

## 2025-06-22 DIAGNOSIS — J44.9 CHRONIC OBSTRUCTIVE PULMONARY DISEASE, UNSPECIFIED: ICD-10-CM

## 2025-06-22 RX ORDER — AMLODIPINE BESYLATE 10 MG/1
10 TABLET ORAL DAILY
Qty: 90 TABLET | Refills: 0 | Status: SHIPPED | OUTPATIENT
Start: 2025-06-22

## 2025-06-22 RX ORDER — ATORVASTATIN CALCIUM 20 MG/1
20 TABLET, FILM COATED ORAL EVERY EVENING
Qty: 90 TABLET | Refills: 0 | Status: SHIPPED | OUTPATIENT
Start: 2025-06-22

## 2025-06-22 RX ORDER — METOPROLOL TARTRATE 25 MG/1
25 TABLET, FILM COATED ORAL 2 TIMES DAILY
Qty: 180 TABLET | Refills: 0 | Status: SHIPPED | OUTPATIENT
Start: 2025-06-22

## 2025-06-23 RX ORDER — ALBUTEROL SULFATE 90 UG/1
1 INHALANT RESPIRATORY (INHALATION) EVERY 4 HOURS PRN
Qty: 18 G | Refills: 2 | Status: SHIPPED | OUTPATIENT
Start: 2025-06-23

## 2025-07-08 ENCOUNTER — APPOINTMENT (OUTPATIENT)
Dept: ORTHOPEDIC SURGERY | Facility: CLINIC | Age: 80
End: 2025-07-08
Payer: MEDICARE

## 2025-07-08 ENCOUNTER — APPOINTMENT (OUTPATIENT)
Dept: RADIOLOGY | Facility: CLINIC | Age: 80
End: 2025-07-08
Payer: MEDICARE

## 2025-07-08 DIAGNOSIS — M25.562 LEFT KNEE PAIN, UNSPECIFIED CHRONICITY: ICD-10-CM

## 2025-07-16 DIAGNOSIS — J44.9 CHRONIC OBSTRUCTIVE PULMONARY DISEASE, UNSPECIFIED: ICD-10-CM

## 2025-07-17 RX ORDER — FLUTICASONE FUROATE AND VILANTEROL TRIFENATATE 100; 25 UG/1; UG/1
1 POWDER RESPIRATORY (INHALATION) DAILY
Qty: 60 EACH | Refills: 2 | Status: SHIPPED | OUTPATIENT
Start: 2025-07-17

## 2025-07-18 ENCOUNTER — OFFICE VISIT (OUTPATIENT)
Dept: ORTHOPEDIC SURGERY | Facility: CLINIC | Age: 80
End: 2025-07-18
Payer: MEDICARE

## 2025-07-18 ENCOUNTER — HOSPITAL ENCOUNTER (OUTPATIENT)
Dept: RADIOLOGY | Facility: CLINIC | Age: 80
Discharge: HOME | End: 2025-07-18
Payer: MEDICARE

## 2025-07-18 DIAGNOSIS — M17.0 BILATERAL PRIMARY OSTEOARTHRITIS OF KNEE: ICD-10-CM

## 2025-07-18 DIAGNOSIS — M25.562 LEFT KNEE PAIN, UNSPECIFIED CHRONICITY: ICD-10-CM

## 2025-07-18 PROCEDURE — 1159F MED LIST DOCD IN RCRD: CPT | Performed by: STUDENT IN AN ORGANIZED HEALTH CARE EDUCATION/TRAINING PROGRAM

## 2025-07-18 PROCEDURE — G2211 COMPLEX E/M VISIT ADD ON: HCPCS | Performed by: STUDENT IN AN ORGANIZED HEALTH CARE EDUCATION/TRAINING PROGRAM

## 2025-07-18 PROCEDURE — 73564 X-RAY EXAM KNEE 4 OR MORE: CPT | Mod: LT

## 2025-07-18 PROCEDURE — 1125F AMNT PAIN NOTED PAIN PRSNT: CPT | Performed by: STUDENT IN AN ORGANIZED HEALTH CARE EDUCATION/TRAINING PROGRAM

## 2025-07-18 PROCEDURE — L1812 KO ELASTIC W/JOINTS PRE OTS: HCPCS | Performed by: STUDENT IN AN ORGANIZED HEALTH CARE EDUCATION/TRAINING PROGRAM

## 2025-07-18 PROCEDURE — 99212 OFFICE O/P EST SF 10 MIN: CPT | Performed by: STUDENT IN AN ORGANIZED HEALTH CARE EDUCATION/TRAINING PROGRAM

## 2025-07-18 PROCEDURE — 99214 OFFICE O/P EST MOD 30 MIN: CPT | Performed by: STUDENT IN AN ORGANIZED HEALTH CARE EDUCATION/TRAINING PROGRAM

## 2025-07-18 ASSESSMENT — PAIN SCALES - GENERAL: PAINLEVEL_OUTOF10: 9

## 2025-07-18 ASSESSMENT — PAIN - FUNCTIONAL ASSESSMENT: PAIN_FUNCTIONAL_ASSESSMENT: 0-10

## 2025-07-20 NOTE — PROGRESS NOTES
HPI: Mary Ann Forde is a pleasant 78 y.o. year-old female who is seen today for follow-up evaluation of bilateral knee pain. She has been following with our office for repeat CSI injections. Her last injection was in April 2025.      The left knee continues to be more symptomatic than the right, feels that her left knee is buckling towards midline at times. She is continuing to use a walker/wheelchair for the community and attempts to get around her house by supporting herself on the furniture. She reports that her symptoms have been stable since her last appointment. No trauma or recent falls.      The patient does have a significant medical history.  She does have a history of COPD on home oxygen somewhere between 3-4 liters at any given time, pulmonary hypertension, atrial fibrillation on Eliquis and obesity. She    Physical Examination:   General: Patient is alert and oriented x 3 and appears comfortable.  Gait: Patient presents in wheelchair  Right Knee: ROM 0-90  Left knee: 10-90, valgus alignment  Stable to varus and valgus stress at 0 and 30 degrees.   Calf: No swelling or tenderness  Able to dorsi-flex and plantar-flex the ankle with appropriate strength. Able to wiggle all toes.   The foot is warm and well perfused with palpable pulses.   Sensation is intact to light touch.     Radiographs: x-ray of the left knee obtained today, severe OA with valgus alignment.     Assessment and Plan: Mary Ann Forde is a 78 y.o. female with end-stage osteoarthritis of both knees. Her medical comorbidities place her at high risk of perioperative and postoperative complication if we were to move forward with total joint replacement.  Specifically, her pulmonary hypertension and oxygen dependence preoperatively are concerning.  I discussed these concerns with the patient.  She has elected to proceed with continued conservative management.     PLAN:  At this time, the patient would like to continue with conservative  treatment given her significant medical comorbidities. We will provide a referral for sports medicine for consideration of HA injections, and also to pain management for consideration of radiofrequency ablation or other modalities.

## 2025-07-22 ENCOUNTER — APPOINTMENT (OUTPATIENT)
Dept: PRIMARY CARE | Facility: CLINIC | Age: 80
End: 2025-07-22
Payer: MEDICARE

## 2025-07-22 VITALS
DIASTOLIC BLOOD PRESSURE: 80 MMHG | SYSTOLIC BLOOD PRESSURE: 119 MMHG | HEIGHT: 69 IN | BODY MASS INDEX: 34.8 KG/M2 | HEART RATE: 85 BPM | WEIGHT: 235 LBS

## 2025-07-22 DIAGNOSIS — I10 ESSENTIAL HYPERTENSION: ICD-10-CM

## 2025-07-22 DIAGNOSIS — I48.91 ATRIAL FIBRILLATION, UNSPECIFIED TYPE (MULTI): ICD-10-CM

## 2025-07-22 DIAGNOSIS — I27.20 PULMONARY HYPERTENSION, UNSPECIFIED (MULTI): ICD-10-CM

## 2025-07-22 DIAGNOSIS — E08.36 DIABETES MELLITUS DUE TO UNDERLYING CONDITION WITH DIABETIC CATARACT, UNSPECIFIED WHETHER LONG TERM INSULIN USE: ICD-10-CM

## 2025-07-22 DIAGNOSIS — E66.01 MORBID (SEVERE) OBESITY DUE TO EXCESS CALORIES (MULTI): ICD-10-CM

## 2025-07-22 DIAGNOSIS — I11.0 HYPERTENSIVE HEART DISEASE WITH HEART FAILURE: Primary | ICD-10-CM

## 2025-07-22 PROCEDURE — 3079F DIAST BP 80-89 MM HG: CPT | Performed by: INTERNAL MEDICINE

## 2025-07-22 PROCEDURE — 1124F ACP DISCUSS-NO DSCNMKR DOCD: CPT | Performed by: INTERNAL MEDICINE

## 2025-07-22 PROCEDURE — 99214 OFFICE O/P EST MOD 30 MIN: CPT | Performed by: INTERNAL MEDICINE

## 2025-07-22 PROCEDURE — G2211 COMPLEX E/M VISIT ADD ON: HCPCS | Performed by: INTERNAL MEDICINE

## 2025-07-22 PROCEDURE — 1160F RVW MEDS BY RX/DR IN RCRD: CPT | Performed by: INTERNAL MEDICINE

## 2025-07-22 PROCEDURE — 3074F SYST BP LT 130 MM HG: CPT | Performed by: INTERNAL MEDICINE

## 2025-07-22 PROCEDURE — 1170F FXNL STATUS ASSESSED: CPT | Performed by: INTERNAL MEDICINE

## 2025-07-22 PROCEDURE — G0439 PPPS, SUBSEQ VISIT: HCPCS | Performed by: INTERNAL MEDICINE

## 2025-07-22 PROCEDURE — 1159F MED LIST DOCD IN RCRD: CPT | Performed by: INTERNAL MEDICINE

## 2025-07-22 RX ORDER — AMLODIPINE BESYLATE 10 MG/1
10 TABLET ORAL DAILY
Qty: 90 TABLET | Refills: 0 | Status: SHIPPED | OUTPATIENT
Start: 2025-07-22

## 2025-07-22 ASSESSMENT — ENCOUNTER SYMPTOMS
RESPIRATORY NEGATIVE: 1
CONSTITUTIONAL NEGATIVE: 1
CARDIOVASCULAR NEGATIVE: 1
GASTROINTESTINAL NEGATIVE: 1

## 2025-07-22 ASSESSMENT — PATIENT HEALTH QUESTIONNAIRE - PHQ9
SUM OF ALL RESPONSES TO PHQ9 QUESTIONS 1 AND 2: 0
2. FEELING DOWN, DEPRESSED OR HOPELESS: NOT AT ALL
1. LITTLE INTEREST OR PLEASURE IN DOING THINGS: NOT AT ALL

## 2025-07-22 ASSESSMENT — ACTIVITIES OF DAILY LIVING (ADL)
BATHING: INDEPENDENT
DOING_HOUSEWORK: INDEPENDENT
TAKING_MEDICATION: INDEPENDENT
DRESSING: INDEPENDENT
MANAGING_FINANCES: INDEPENDENT
GROCERY_SHOPPING: NEEDS ASSISTANCE

## 2025-07-22 ASSESSMENT — LIFESTYLE VARIABLES
HOW OFTEN DO YOU HAVE A DRINK CONTAINING ALCOHOL: MONTHLY OR LESS
HOW MANY STANDARD DRINKS CONTAINING ALCOHOL DO YOU HAVE ON A TYPICAL DAY: 1 OR 2
HOW OFTEN DO YOU HAVE SIX OR MORE DRINKS ON ONE OCCASION: NEVER
SKIP TO QUESTIONS 9-10: 1
AUDIT-C TOTAL SCORE: 1

## 2025-07-22 NOTE — PROGRESS NOTES
Chief Complaint:   Chief Complaint   Patient presents with    Medicare Annual Wellness Visit Initial      HPI    The patient is being seen for the subsequent annual wellness visit and follow up.  Past Medical, Surgical and Family History: reviewed and updated in chart.   Interval History: Patient has not been hospitalized previously.   Medications and Supplements: Review of all medications by a prescribing practitioner or clinical pharmacist (such as prescriptions, OTCs, herbal therapies and supplements) documented in the medical record.    No, the patient is not using opioids.   Health Risk Assessment:. Paper HRA completed by patient and scanned into chart.   Depression/Suicide Screening:  .   Done  No falls in the past year.  No recent hospitalizations.  Advance care planning completed.     Past Medical History   Medical History[1]   Past Surgical History:   Surgical History[2]  Family History:   Family History[3]  Social History:   Tobacco Use: Medium Risk (7/22/2025)    Patient History     Smoking Tobacco Use: Former     Smokeless Tobacco Use: Never     Passive Exposure: Past      Social History     Substance and Sexual Activity   Alcohol Use Yes    Comment: socially        Allergies:   RX Allergies[4]     ROS   Review of Systems   Constitutional: Negative.    Respiratory: Negative.     Cardiovascular: Negative.    Gastrointestinal: Negative.         Objective   Vitals:    07/22/25 1343   BP: 119/80   Pulse: 85      BMI Readings from Last 15 Encounters:   07/22/25 34.70 kg/m²   04/14/25 36.18 kg/m²   03/21/25 36.18 kg/m²   12/18/24 36.18 kg/m²   10/04/24 36.18 kg/m²   09/25/24 35.44 kg/m²   09/24/24 35.87 kg/m²   06/05/24 35.57 kg/m²   03/05/24 35.47 kg/m²   10/03/23 35.44 kg/m²   06/27/23 34.70 kg/m²   04/12/23 36.03 kg/m²   02/24/23 36.48 kg/m²   02/08/23 36.03 kg/m²   11/09/22 37.07 kg/m²      107 kg (235 lb) (7/22/2025  1:43 PM)      Physical Exam  Physical Exam  Constitutional:       Appearance: Normal  "appearance. She is well-developed.   HENT:      Head: Normocephalic and atraumatic.     Cardiovascular:      Rate and Rhythm: Normal rate and regular rhythm.      Pulses:           Posterior tibial pulses are 2+ on the right side and 2+ on the left side.      Heart sounds: Normal heart sounds.   Pulmonary:      Effort: Pulmonary effort is normal.      Breath sounds: Normal breath sounds.     Musculoskeletal:         General: Normal range of motion.      Cervical back: Normal range of motion and neck supple.      Right lower leg: No edema.      Left lower leg: No edema.     Skin:     General: Skin is warm and dry.     Neurological:      General: No focal deficit present.      Mental Status: She is alert and oriented to person, place, and time.     Psychiatric:         Mood and Affect: Mood normal.         Behavior: Behavior normal.          Labs:  CBC:  Recent Labs     09/27/24  0626 09/26/24  0825 09/25/24  0642   WBC 9.5 12.7* 4.7   HGB 12.8 12.2 12.0   HCT 39.9 37.7 37.1    296 273    99 99     CMP:  Recent Labs     04/08/25 1713 09/27/24  0626 09/26/24  0825    143 140   K 4.6 4.1 4.4    102 102   CO2 31 32 31   ANIONGAP 8 13 11   BUN 8 12 10   CREATININE 0.57* 0.54 0.49*   EGFR 94 >90 >90   GLUCOSE 109* 86 136*     Recent Labs     04/08/25 1713 09/25/24  0642 06/05/24  1435   ALBUMIN 4.2 3.5 4.1   ALKPHOS 63 63 73   ALT 21 13 10   AST 19 14 11   BILITOT 1.3* 0.8 1.0     Calcium/Phos:   Lab Results   Component Value Date    CALCIUM 9.9 04/08/2025    PHOS 3.9 06/18/2020      COAG:   Recent Labs     04/29/22  0734 04/24/22  1023 06/17/20  1649   INR 1.5*  --  1.5*   DDIMERVTE  --  396  --      CRP: No results found for: \"CRP\"   [unfilled]   ENDO:  Recent Labs     04/08/25  1713 06/05/24  1435 10/03/23  1511 06/07/23  1454   TSH 0.61 0.78 1.05 0.71   HGBA1C 5.9* 5.8* 5.6 6.3*      CARDIAC:   Recent Labs     09/24/24  1726 09/24/24  1614 04/29/22  1010 04/29/22  0824 04/29/22  0734 " 04/24/22  1133 04/24/22  1023   TROPHS 8 8 11   < > 15*   < > 13   BNP  --  184*  --   --  118*  --  89    < > = values in this interval not displayed.     Recent Labs     04/08/25  1713 06/05/24  1435 10/03/23  1511 11/09/22  1428 04/05/22  1338 01/25/21  1324   CHOL 134 135 133 127 139 133   LDLF  --   --   --  66 73 64   LDLCALC 65 78 64*  --   --   --    HDL 55 43.7 57.6 49.7 52.1 55.9   TRIG 55 66 58 57 68 64     No data recorded    Current Medications:  Current Medications[5]    Assessment and Plan  Mary Ann was seen today for medicare annual wellness visit initial.  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, unspecified whether long term insulin use   Follow up on chronic conditions  Doing well overall  Depression not well controlled  Having some anxiety  Sleep is disturbed  Recommend to restart SSRIs at a low dose  Take it at night  Follow up  in 3 months  Up to date with vaccinations  Working on her living will        Immunizations:  Immunization History   Administered Date(s) Administered    Flu vaccine (IIV4), preservative free *Check age/dose* 11/23/2015    Flu vaccine, quadrivalent, high-dose, preservative free, age 65y+ (FLUZONE) 11/09/2022, 10/03/2023    Flu vaccine, trivalent, preservative free, HIGH-DOSE, age 65y+ (Fluzone) 10/10/2017, 11/09/2018, 01/25/2021, 09/21/2021, 10/04/2024    Flu vaccine, trivalent, preservative free, age 6 months and greater (Fluarix/Fluzone/Flulaval) 09/07/2011, 09/05/2012, 09/18/2013, 10/07/2014, 11/09/2022    Pfizer COVID-19 vaccine, 12 years and older, (30mcg/0.3mL) (Comirnaty) 11/02/2024    Pfizer Purple Cap SARS-CoV-2 03/14/2021, 04/11/2021, 04/11/2021, 01/11/2022    Pneumococcal conjugate vaccine, 13-valent (PREVNAR 13) 10/10/2017     Pneumococcal polysaccharide vaccine, 23-valent, age 2 years and older (PNEUMOVAX 23) 11/09/2018    Tdap vaccine, age 7 year and older (BOOSTRIX, ADACEL) 10/03/2023    Zoster vaccine, recombinant, adult (SHINGRIX) 02/13/2019         Medicare Wellness Billing Compliance Satisfied    *This is a visual tool to show completion of required items on the day of the visit. Green checks will only appear on the date of visit.    Review all medications by prescribing practitioner or clinical pharmacist (such as prescriptions, OTCs, herbal therapies and supplements) documented in the medical record    Past Medical, Surgical, and Family History reviewed and updated in chart    Tobacco Use Reviewed    Alcohol Use Reviewed    Illicit Drug Use Reviewed    PHQ2/9    Falls in Last Year Reviewed    Home Safety Risk Factors Reviewed    Cognitive Impairment Reviewed    Patient Self Assessment and Health Status    Current Diet Reviewed    Exercise Frequency    ADL - Hearing Impairment    ADL - Bathing    ADL - Dressing    ADL - Walks in Home    IADL - Managing Finances    IADL - Grocery Shopping    IADL - Taking Medications    IADL - Doing Housework    Vision Screening            [1]   Past Medical History:  Diagnosis Date    Body mass index (BMI) 37.0-37.9, adult 02/13/2019    BMI 37.0-37.9, adult    Body mass index (BMI) 38.0-38.9, adult 04/05/2022    BMI 38.0-38.9,adult    Drug-induced obesity 04/05/2022    Class 2 drug-induced obesity with body mass index (BMI) of 38.0 to 38.9 in adult, unspecified whether serious comorbidity present    Morbid (severe) obesity due to excess calories (Multi) 01/11/2022    Severe obesity (BMI 35.0-39.9) with comorbidity    Personal history of other specified conditions 06/19/2020    History of bacteremia   [2]   Past Surgical History:  Procedure Laterality Date    HYSTERECTOMY  07/29/2013    Hysterectomy   [3] No family history on file.  [4] No Known Allergies  [5]   Current  Outpatient Medications   Medication Sig Dispense Refill    albuterol 90 mcg/actuation inhaler INHALE 1 PUFF EVERY 4 HOURS IF NEEDED FOR WHEEZING OR SHORTNESS OF BREATH 18 g 2    alendronate (Fosamax) 70 mg tablet TAKE 1 TABLET (70 MG) BY MOUTH EVERY 7 DAYS. TAKE IN THE MORNING WITH A FULL GLASS OF WATER, ON AN EMPTY STOMACH, AND DO NOT TAKE ANYTHING ELSE BY MOUTH OR LIE DOWN FOR THE NEXT 30 MIN. 12 tablet 0    amLODIPine (Norvasc) 10 mg tablet TAKE 1 TABLET BY MOUTH EVERY DAY FOR BLOOD PRESSURE 90 tablet 0    apixaban (Eliquis) 5 mg tablet Take 1 tablet (5 mg) by mouth 2 times a day. 180 tablet 0    atorvastatin (Lipitor) 20 mg tablet TAKE 1 TABLET (20 MG) BY MOUTH ONCE DAILY IN THE EVENING 90 tablet 0    Breo Ellipta 100-25 mcg/dose inhaler INHALE 1 PUFF BY MOUTH ONCE DAILY 60 each 2    cholecalciferol (Vitamin D3) 50 MCG (2000 UT) tablet Take 1 tablet (50 mcg) by mouth once daily.      ipratropium-albuteroL (Duo-Neb) 0.5-2.5 mg/3 mL nebulizer solution Take 3 mL by nebulization every 6 hours if needed for shortness of breath. (Patient not taking: Reported on 4/14/2025) 180 mL 1    magnesium oxide (Mag-Ox) 400 mg (241.3 mg magnesium) tablet Take 1 tablet (400 mg) by mouth once daily.      metoprolol tartrate (Lopressor) 25 mg tablet TAKE 1 TABLET BY MOUTH TWICE A  tablet 0    netarsudiL-latanoprost (Rocklatan) 0.02-0.005 % drops Administer 1 drop into the right eye once daily at bedtime.      PARoxetine (Paxil) 10 mg tablet Take 1 tablet (10 mg) by mouth once daily in the evening. (Patient not taking: Reported on 4/14/2025) 30 tablet 1    prednisoLONE acetate (Pred-Forte) 1 % ophthalmic suspension Administer 1 drop into the left eye 2 times a day.      timolol (Timoptic) 0.5 % ophthalmic solution Administer 1 drop into the left eye once daily. 3 mL 0     No current facility-administered medications for this visit.

## 2025-07-29 DIAGNOSIS — I48.91 ATRIAL FIBRILLATION, UNSPECIFIED TYPE (MULTI): ICD-10-CM

## 2025-07-29 RX ORDER — APIXABAN 5 MG/1
5 TABLET, FILM COATED ORAL 2 TIMES DAILY
Qty: 180 TABLET | Refills: 0 | Status: SHIPPED | OUTPATIENT
Start: 2025-07-29

## 2025-08-28 ENCOUNTER — APPOINTMENT (OUTPATIENT)
Dept: ORTHOPEDIC SURGERY | Facility: HOSPITAL | Age: 80
End: 2025-08-28
Payer: MEDICARE

## 2025-10-21 ENCOUNTER — APPOINTMENT (OUTPATIENT)
Dept: PRIMARY CARE | Facility: CLINIC | Age: 80
End: 2025-10-21
Payer: MEDICARE